# Patient Record
Sex: FEMALE | Race: OTHER | NOT HISPANIC OR LATINO | URBAN - METROPOLITAN AREA
[De-identification: names, ages, dates, MRNs, and addresses within clinical notes are randomized per-mention and may not be internally consistent; named-entity substitution may affect disease eponyms.]

---

## 2021-02-21 ENCOUNTER — EMERGENCY (EMERGENCY)
Facility: HOSPITAL | Age: 26
LOS: 1 days | Discharge: HOME | End: 2021-02-21
Attending: EMERGENCY MEDICINE | Admitting: EMERGENCY MEDICINE
Payer: MEDICAID

## 2021-02-21 VITALS
HEIGHT: 63 IN | RESPIRATION RATE: 18 BRPM | TEMPERATURE: 98 F | SYSTOLIC BLOOD PRESSURE: 138 MMHG | HEART RATE: 95 BPM | OXYGEN SATURATION: 98 % | DIASTOLIC BLOOD PRESSURE: 86 MMHG | WEIGHT: 192.02 LBS

## 2021-02-21 DIAGNOSIS — R07.89 OTHER CHEST PAIN: ICD-10-CM

## 2021-02-21 PROCEDURE — 71046 X-RAY EXAM CHEST 2 VIEWS: CPT | Mod: 26

## 2021-02-21 PROCEDURE — 99284 EMERGENCY DEPT VISIT MOD MDM: CPT

## 2021-02-21 PROCEDURE — 71046 X-RAY EXAM CHEST 2 VIEWS: CPT

## 2021-02-21 PROCEDURE — 99283 EMERGENCY DEPT VISIT LOW MDM: CPT

## 2021-02-21 NOTE — ED ADULT NURSE NOTE - OBJECTIVE STATEMENT
Patient c/o of right chest pain and tenderness, s/p states did a backflip at home, fell off from bed, w/ pain worse when lifting right arm , able to raise right arm over head w/ some pain.  States felt a cracking sound on right chest when did the backflip, even before falling.  Denies hitting head, no neck or back pain.

## 2021-02-21 NOTE — ED PROVIDER NOTE - CLINICAL SUMMARY MEDICAL DECISION MAKING FREE TEXT BOX
24 y/o healthy F presents to the ED c/o R sided chest wall pain x 1 week. Low suspicion for PTX, but will get XR chest to r/o. Currently satting 98% on room air, non-tachypneic, good breath sounds. 24 y/o healthy F presents to the ED c/o R sided chest wall pain x 1 week. Currently satting 98% on room air, non-tachypneic, good breath sounds.  Low suspicion for PTX, but will get XR chest to r/o. 24 y/o healthy F presents to the ED c/o R sided chest wall pain x 1 week. Currently satting 98% on room air, non-tachypneic, good breath sounds.  Low suspicion for PTX, but will get XR chest to r/o.  CXR neg.  Feels better with neb.  Inhaler prescribed.

## 2021-02-21 NOTE — ED ADULT TRIAGE NOTE - CHIEF COMPLAINT QUOTE
last week pt did a back flip and fell off the bed. reports R sided chest soreness since. Pain worsened with movement of R shoulder. denies SOB, fevers. chills cough.

## 2021-02-21 NOTE — ED PROVIDER NOTE - MUSCULOSKELETAL, MLM
Spine appears normal, range of motion is not limited, no muscle or joint tenderness, nonreproducible chest wall tenderness, no ecchymosis to the chest wall.

## 2021-02-21 NOTE — ED PROVIDER NOTE - NSFOLLOWUPINSTRUCTIONS_ED_ALL_ED_FT
Take ibuprofen as directed for chest wall pain.    Follow up with your PMD.               Chest Wall Pain    WHAT YOU NEED TO KNOW:    What do I need to know about chest wall pain? Chest wall pain may be caused by problems with the muscles, cartilage, or bones of the chest wall. Chest wall pain may also be caused by pain that spreads to your chest from another part of your body. The pain may be aching, severe, dull, or sharp. It may come and go, or it may be constant. The pain may be worse when you move in certain ways, breathe deeply, or cough.     What causes chest wall pain?   •Conditions that affect the joints or cartilage of the chest wall, such as arthritis or costochondritis      •Strain or injury of the chest wall muscles       •Fractures of the ribs or vertebrae (bones in your spine)      •Herniation of the discs in the upper or middle section of your spine       •Shingles      How is chest wall pain diagnosed? Your healthcare provider will ask you to describe your pain. Tell him when the pain started, what type of pain it is, and what makes it better or worse. He will also ask if you have any other symptoms. He will examine your chest. He may also ask you to move your arms in different directions to see how it affects your pain. Ask your healthcare provider about these and other tests you may need:   •A chest x-ray may show the cause of your chest wall pain. You may need more than one x-ray.      •An MRI takes pictures of your chest or spine to show the cause of your chest wall pain. You may be given contrast liquid to help the pictures show up better. Tell a healthcare provider if you have ever had an allergic reaction to contrast liquid. Do not enter the MRI room with anything metal. Metal can cause serious injury. Tell a healthcare provider if you have any metal in or on your body.      How is chest wall pain treated? Treatment depends on the cause of your chest wall pain. You may need any of the following:   •NSAIDs, such as ibuprofen, help decrease swelling, pain, and fever. This medicine is available with or without a doctor's order. NSAIDs can cause stomach bleeding or kidney problems in certain people. If you take blood thinner medicine, always ask your healthcare provider if NSAIDs are safe for you. Always read the medicine label and follow directions.      •Acetaminophen decreases pain. It is available without a doctor's order. Ask how much to take and how often to take it. Follow directions. Acetaminophen can cause liver damage if not taken correctly.      •A cream may be applied to your chest to decrease pain.       •Apply heat on your chest for 20 to 30 minutes every 2 hours for as many days as directed. Heat helps decrease pain and muscle spasms.      •Apply ice on your chest for 15 to 20 minutes every hour or as directed. Use an ice pack, or put crushed ice in a plastic bag. Cover it with a towel. Ice helps prevent tissue damage and decreases swelling and pain.       Call 911 if:   •You have any of the following signs of a heart attack: ?Squeezing, pressure, or pain in your chest      ?You may also have any of the following: ?Discomfort or pain in your back, neck, jaw, stomach, or arm      ?Shortness of breath      ?Nausea or vomiting      ?Lightheadedness or a sudden cold sweat            When should I seek immediate care?   •You have severe pain.          When should I contact my healthcare provider?   •You develop a rash.       •You have other new symptoms.      •Your pain does not improve, even with treatment.      •You have questions or concerns about your condition or care.       CARE AGREEMENT:    You have the right to help plan your care. Learn about your health condition and how it may be treated. Discuss treatment options with your healthcare providers to decide what care you want to receive. You always have the right to refuse treatment.        © Copyright [employer] 2021           back to top                          © Copyright [employer] 2021

## 2021-02-21 NOTE — ED ADULT NURSE NOTE - NSIMPLEMENTINTERV_GEN_ALL_ED
Implemented All Fall Risk Interventions:  [place] to call system. Call bell, personal items and telephone within reach. Instruct patient to call for assistance. Room bathroom lighting operational. Non-slip footwear when patient is off stretcher. Physically safe environment: no spills, clutter or unnecessary equipment. Stretcher in lowest position, wheels locked, appropriate side rails in place. Provide visual cue, wrist band, yellow gown, etc. Monitor gait and stability. Monitor for mental status changes and reorient to person, place, and time. Review medications for side effects contributing to fall risk. Reinforce activity limits and safety measures with patient and family.

## 2021-02-21 NOTE — ED PROVIDER NOTE - OBJECTIVE STATEMENT
26 y/o healthy F presents to the ED c/o R sided chest wall pain. Pt states about a week ago today, she was teaching her daughter how to do a backflip on her bed and fell off, hitting her R chest on the ground. Pt says there was a bruise on her chest initially but that went away, but still has pain with arm movements and when taking deep breaths. Currently not SOB. Denies any other injuries.

## 2021-02-21 NOTE — ED PROVIDER NOTE - PATIENT PORTAL LINK FT
You can access the FollowMyHealth Patient Portal offered by [facility] by registering at the following website: [email or web address]. By joining [employer]’s FollowMyHealth portal, you will also be able to view your health information using other applications (apps) compatible with our system.

## 2021-08-01 ENCOUNTER — EMERGENCY (EMERGENCY)
Facility: HOSPITAL | Age: 26
LOS: 1 days | Discharge: HOME | End: 2021-08-01
Attending: EMERGENCY MEDICINE | Admitting: EMERGENCY MEDICINE
Payer: MEDICAID

## 2021-08-01 VITALS
RESPIRATION RATE: 18 BRPM | SYSTOLIC BLOOD PRESSURE: 117 MMHG | DIASTOLIC BLOOD PRESSURE: 79 MMHG | WEIGHT: 171.08 LBS | TEMPERATURE: 99 F | HEART RATE: 82 BPM | OXYGEN SATURATION: 100 % | HEIGHT: 63 IN

## 2021-08-01 VITALS
TEMPERATURE: 98 F | SYSTOLIC BLOOD PRESSURE: 120 MMHG | OXYGEN SATURATION: 96 % | DIASTOLIC BLOOD PRESSURE: 81 MMHG | RESPIRATION RATE: 18 BRPM | HEART RATE: 88 BPM

## 2021-08-01 DIAGNOSIS — K62.5 HEMORRHAGE OF ANUS AND RECTUM: ICD-10-CM

## 2021-08-01 DIAGNOSIS — K64.9 UNSPECIFIED HEMORRHOIDS: ICD-10-CM

## 2021-08-01 DIAGNOSIS — R42 DIZZINESS AND GIDDINESS: ICD-10-CM

## 2021-08-01 PROBLEM — Z78.9 OTHER SPECIFIED HEALTH STATUS: Chronic | Status: ACTIVE | Noted: 2021-02-21

## 2021-08-01 LAB
ALBUMIN SERPL ELPH-MCNC: 4.4 G/DL — SIGNIFICANT CHANGE UP (ref 3.3–5)
ALP SERPL-CCNC: 70 U/L — SIGNIFICANT CHANGE UP (ref 40–120)
ALT FLD-CCNC: 13 U/L — SIGNIFICANT CHANGE UP (ref 10–45)
ANION GAP SERPL CALC-SCNC: 12 MMOL/L — SIGNIFICANT CHANGE UP (ref 5–17)
AST SERPL-CCNC: 16 U/L — SIGNIFICANT CHANGE UP (ref 10–40)
BASOPHILS # BLD AUTO: 0.03 K/UL — SIGNIFICANT CHANGE UP (ref 0–0.2)
BASOPHILS NFR BLD AUTO: 0.4 % — SIGNIFICANT CHANGE UP (ref 0–2)
BILIRUB SERPL-MCNC: 0.4 MG/DL — SIGNIFICANT CHANGE UP (ref 0.2–1.2)
BUN SERPL-MCNC: 14 MG/DL — SIGNIFICANT CHANGE UP (ref 7–23)
CALCIUM SERPL-MCNC: 9.4 MG/DL — SIGNIFICANT CHANGE UP (ref 8.4–10.5)
CHLORIDE SERPL-SCNC: 105 MMOL/L — SIGNIFICANT CHANGE UP (ref 96–108)
CO2 SERPL-SCNC: 24 MMOL/L — SIGNIFICANT CHANGE UP (ref 22–31)
CREAT SERPL-MCNC: 0.54 MG/DL — SIGNIFICANT CHANGE UP (ref 0.5–1.3)
EOSINOPHIL # BLD AUTO: 0.1 K/UL — SIGNIFICANT CHANGE UP (ref 0–0.5)
EOSINOPHIL NFR BLD AUTO: 1.2 % — SIGNIFICANT CHANGE UP (ref 0–6)
GLUCOSE SERPL-MCNC: 80 MG/DL — SIGNIFICANT CHANGE UP (ref 70–99)
HCT VFR BLD CALC: 40.3 % — SIGNIFICANT CHANGE UP (ref 34.5–45)
HGB BLD-MCNC: 12.8 G/DL — SIGNIFICANT CHANGE UP (ref 11.5–15.5)
IMM GRANULOCYTES NFR BLD AUTO: 0.2 % — SIGNIFICANT CHANGE UP (ref 0–1.5)
LYMPHOCYTES # BLD AUTO: 1.56 K/UL — SIGNIFICANT CHANGE UP (ref 1–3.3)
LYMPHOCYTES # BLD AUTO: 18.8 % — SIGNIFICANT CHANGE UP (ref 13–44)
MCHC RBC-ENTMCNC: 27.9 PG — SIGNIFICANT CHANGE UP (ref 27–34)
MCHC RBC-ENTMCNC: 31.8 GM/DL — LOW (ref 32–36)
MCV RBC AUTO: 87.8 FL — SIGNIFICANT CHANGE UP (ref 80–100)
MONOCYTES # BLD AUTO: 0.29 K/UL — SIGNIFICANT CHANGE UP (ref 0–0.9)
MONOCYTES NFR BLD AUTO: 3.5 % — SIGNIFICANT CHANGE UP (ref 2–14)
NEUTROPHILS # BLD AUTO: 6.31 K/UL — SIGNIFICANT CHANGE UP (ref 1.8–7.4)
NEUTROPHILS NFR BLD AUTO: 75.9 % — SIGNIFICANT CHANGE UP (ref 43–77)
NRBC # BLD: 0 /100 WBCS — SIGNIFICANT CHANGE UP (ref 0–0)
PLATELET # BLD AUTO: 279 K/UL — SIGNIFICANT CHANGE UP (ref 150–400)
POTASSIUM SERPL-MCNC: 3.5 MMOL/L — SIGNIFICANT CHANGE UP (ref 3.5–5.3)
POTASSIUM SERPL-SCNC: 3.5 MMOL/L — SIGNIFICANT CHANGE UP (ref 3.5–5.3)
PROT SERPL-MCNC: 7.1 G/DL — SIGNIFICANT CHANGE UP (ref 6–8.3)
RBC # BLD: 4.59 M/UL — SIGNIFICANT CHANGE UP (ref 3.8–5.2)
RBC # FLD: 14.5 % — SIGNIFICANT CHANGE UP (ref 10.3–14.5)
SODIUM SERPL-SCNC: 141 MMOL/L — SIGNIFICANT CHANGE UP (ref 135–145)
WBC # BLD: 8.31 K/UL — SIGNIFICANT CHANGE UP (ref 3.8–10.5)
WBC # FLD AUTO: 8.31 K/UL — SIGNIFICANT CHANGE UP (ref 3.8–10.5)

## 2021-08-01 PROCEDURE — 99283 EMERGENCY DEPT VISIT LOW MDM: CPT

## 2021-08-01 PROCEDURE — 85025 COMPLETE CBC W/AUTO DIFF WBC: CPT

## 2021-08-01 PROCEDURE — 93005 ELECTROCARDIOGRAM TRACING: CPT

## 2021-08-01 PROCEDURE — 36415 COLL VENOUS BLD VENIPUNCTURE: CPT

## 2021-08-01 PROCEDURE — 80053 COMPREHEN METABOLIC PANEL: CPT

## 2021-08-01 PROCEDURE — 82962 GLUCOSE BLOOD TEST: CPT

## 2021-08-01 PROCEDURE — 99284 EMERGENCY DEPT VISIT MOD MDM: CPT

## 2021-08-01 PROCEDURE — 93010 ELECTROCARDIOGRAM REPORT: CPT

## 2021-08-01 NOTE — ED ADULT NURSE NOTE - NSIMPLEMENTINTERV_GEN_ALL_ED
Implemented All Universal Safety Interventions:  [place] to call system. Call bell, personal items and telephone within reach. Instruct patient to call for assistance. Room bathroom lighting operational. Non-slip footwear when patient is off stretcher. Physically safe environment: no spills, clutter or unnecessary equipment. Stretcher in lowest position, wheels locked, appropriate side rails in place.

## 2021-08-01 NOTE — ED PROVIDER NOTE - CARE PROVIDERS DIRECT ADDRESSES
[email or web address]

## 2021-08-01 NOTE — ED PROVIDER NOTE - CLINICAL SUMMARY MEDICAL DECISION MAKING FREE TEXT BOX
BRBPR with BM. pt well appearing. no bleeding on exam. + hemorrhoids. vss. labs noted hgb wnl. suspect bleeding due to hemorrhoids. ecg done at triage as pt reported dizziness earlier today. no current dizziness. no ischemic changes. will dc home to f/u with GI. sitz bath, increase fluids and fiber. return precuations d/w pt.

## 2021-08-01 NOTE — ED PROVIDER NOTE - GASTROINTESTINAL, MLM
Abdomen soft, non-tender, no guarding, no rebound. well healing lower abdominal scar. SUSI + brown colored stool. no BRB. + non swollen hemorrhoid present

## 2021-08-01 NOTE — ED PROVIDER NOTE - NSFOLLOWUPINSTRUCTIONS_ED_ALL_ED_FT
Follow up with gastroenterology within one week.       Rectal Bleeding    WHAT YOU NEED TO KNOW:    Rectal bleeding can be caused by constipation, hemorrhoids, or anal fissures. It may also be caused by polyps, tumors, or medical conditions, such as colitis or diverticulitis.    DISCHARGE INSTRUCTIONS:    Medicines:   •Pain medicine: You may be given medicine to take away or decrease pain. Do not wait until the pain is severe before you take your medicine.      •Iron supplement: Iron helps your body make more red blood cells.       •Steroids: This medicine decreases inflammation in your rectum. It may be applied as a cream, ointment, or lotion.      •Take your medicine as directed. Contact your healthcare provider if you think your medicine is not helping or if you have side effects. Tell him of her if you are allergic to any medicine. Keep a list of the medicines, vitamins, and herbs you take. Include the amounts, and when and why you take them. Bring the list or the pill bottles to follow-up visits. Carry your medicine list with you in case of an emergency.      Follow up with your healthcare provider as directed: Write down your questions so you remember to ask them during your visits.     Drink liquids as directed: Ask your healthcare provider how much liquid to drink each day and which liquids are best for you. This will help prevent dehydration and constipation.    Contact your healthcare provider if:   •You have a fever.      •Your rectal bleeding stopped for a time, but has started again.       •You have nausea.      •You have cold, sweaty, pale skin.      •You have changes in your bowel movements, such as diarrhea.      •You have questions or concerns about your condition or care.      Return to the emergency department if:   •You are breathing faster than usual.      •You are dizzy, lightheaded, or feel faint.      •You are confused or cannot think clearly.      •You urinate less than usual or not at all.      •Your rectal bleeding is constant or heavy.      •You have severe abdominal pain or cramping.         © Copyright [employer] 2021           back to top                          © Copyright [employer] 2021

## 2021-08-01 NOTE — ED ADULT NURSE NOTE - OBJECTIVE STATEMENT
Pt came to ER with c/o "rectal pain and bleeding on friday after a hard bowel movement and today too". Pt denies NVD, fever, chills. Pt states she has a chronic hx of constipation but had been ok for a while after her surgery.

## 2021-08-01 NOTE — ED PROVIDER NOTE - PATIENT PORTAL LINK FT
You can access the FollowMyHealth Patient Portal offered by [facility] by registering at the following website: [email or web address]. By joining [employer]’s FollowMyHealth portal, you will also be able to view your health information using other applications (apps) compatible with our system.

## 2021-08-01 NOTE — ED PROVIDER NOTE - ATTENDING CONTRIBUTION TO CARE
as per HPI.   Vitals wnl, exam as above. Very well appearing. labs grossly wnl.  Clinically no indication for further emergent ED workup or hospitalization at this time. Comfortable for dc, outpt f/u.

## 2021-08-01 NOTE — ED PROVIDER NOTE - PROVIDER TOKENS
PROVIDER:[TOKEN:[[id number]],FOLLOWUP:[7-10 Days]],PROVIDER:[TOKEN:[[id number]],FOLLOWUP:[7-10 Days]]

## 2021-08-01 NOTE — ED PROVIDER NOTE - OBJECTIVE STATEMENT
26 y/o female with hx of abdominoplasty 1 month ago c/o rectal bleeding. pt states was straining to have BM 2 days ago and noticed BRB on tissue and in stool. pt notes no BM yesterday but bleeding occurred again today with BM. no melena. no abd pain, n/v. no fever or chills. pt notes hx of hemorrhoids after pregnancy. no urinary sx's. no vag dc or bleeding. Also pt notes felt lightheaded today. no ha or weakness. no visual changes. no cp or sob. no further complaints.

## 2021-08-01 NOTE — ED ADULT TRIAGE NOTE - ARRIVAL FROM
Home 4 - Moderately severe disability. Unable to own bodily needs without assistance and unable to walk unassisted

## 2021-08-01 NOTE — ED ADULT TRIAGE NOTE - CHIEF COMPLAINT QUOTE
Pt reports straining to have BM on Friday, noticed bright red blood in stool and wiping after BM. Pt reports one more episode of bleeding today, also reports intermittent lightheadedness, denies chest pain, sob, n/v, diarrhea.

## 2021-08-01 NOTE — ED PROVIDER NOTE - CARE PROVIDER_API CALL
[facility])  Medicine  [address], [address]  [place] [address]  Phone: [phone]  Fax: [phone]  Follow Up Time: 7-10 Days    Mane Fu)  Gastroenterology; Internal Medicine  [address] [place] [address]  Phone: [phone]  Fax: [phone]  Follow Up Time: 7-10 Days

## 2023-06-05 NOTE — ED ADULT NURSE NOTE - NSHOSCREENINGQ1_ED_ALL_ED
Recent fall, does have hx of back pain, recent PT for thoracic pain. SLR +ve ziggy. No acute concerns on exam. Rx IBU . Monitor closely, if persists consider Sports Medicine referral /MRI. Patient will call/message .  Feels she can have restrictions at work .   
No

## 2024-09-23 ENCOUNTER — APPOINTMENT (OUTPATIENT)
Dept: ANTEPARTUM | Facility: CLINIC | Age: 29
End: 2024-09-23

## 2024-09-23 ENCOUNTER — ASOB RESULT (OUTPATIENT)
Age: 29
End: 2024-09-23

## 2024-09-23 PROCEDURE — 76813 OB US NUCHAL MEAS 1 GEST: CPT

## 2024-09-23 PROCEDURE — 93976 VASCULAR STUDY: CPT

## 2024-09-23 PROCEDURE — 36415 COLL VENOUS BLD VENIPUNCTURE: CPT

## 2024-10-18 ENCOUNTER — APPOINTMENT (OUTPATIENT)
Dept: ANTEPARTUM | Facility: CLINIC | Age: 29
End: 2024-10-18

## 2024-10-18 ENCOUNTER — TRANSCRIPTION ENCOUNTER (OUTPATIENT)
Age: 29
End: 2024-10-18

## 2024-10-18 ENCOUNTER — ASOB RESULT (OUTPATIENT)
Age: 29
End: 2024-10-18

## 2024-10-18 PROCEDURE — 76817 TRANSVAGINAL US OBSTETRIC: CPT

## 2024-10-18 PROCEDURE — 76805 OB US >/= 14 WKS SNGL FETUS: CPT | Mod: 59

## 2024-11-22 ENCOUNTER — APPOINTMENT (OUTPATIENT)
Dept: ANTEPARTUM | Facility: CLINIC | Age: 29
End: 2024-11-22

## 2024-11-22 ENCOUNTER — ASOB RESULT (OUTPATIENT)
Age: 29
End: 2024-11-22

## 2024-11-22 PROCEDURE — 76817 TRANSVAGINAL US OBSTETRIC: CPT

## 2024-11-22 PROCEDURE — 76811 OB US DETAILED SNGL FETUS: CPT | Mod: 59

## 2024-12-13 ENCOUNTER — APPOINTMENT (OUTPATIENT)
Dept: ANTEPARTUM | Facility: CLINIC | Age: 29
End: 2024-12-13
Payer: COMMERCIAL

## 2024-12-13 ENCOUNTER — ASOB RESULT (OUTPATIENT)
Age: 29
End: 2024-12-13

## 2024-12-13 PROCEDURE — 76816 OB US FOLLOW-UP PER FETUS: CPT

## 2024-12-31 PROBLEM — Z00.00 ENCOUNTER FOR PREVENTIVE HEALTH EXAMINATION: Status: ACTIVE | Noted: 2024-12-31

## 2025-01-10 ENCOUNTER — ASOB RESULT (OUTPATIENT)
Age: 30
End: 2025-01-10

## 2025-01-10 ENCOUNTER — APPOINTMENT (OUTPATIENT)
Dept: ANTEPARTUM | Facility: CLINIC | Age: 30
End: 2025-01-10

## 2025-01-10 PROCEDURE — 76816 OB US FOLLOW-UP PER FETUS: CPT

## 2025-01-10 PROCEDURE — 76819 FETAL BIOPHYS PROFIL W/O NST: CPT | Mod: 59

## 2025-01-10 PROCEDURE — 76820 UMBILICAL ARTERY ECHO: CPT | Mod: 59

## 2025-02-07 ENCOUNTER — APPOINTMENT (OUTPATIENT)
Dept: ANTEPARTUM | Facility: CLINIC | Age: 30
End: 2025-02-07
Payer: COMMERCIAL

## 2025-02-07 ENCOUNTER — ASOB RESULT (OUTPATIENT)
Age: 30
End: 2025-02-07

## 2025-02-07 PROCEDURE — 76819 FETAL BIOPHYS PROFIL W/O NST: CPT | Mod: 59

## 2025-02-07 PROCEDURE — 76820 UMBILICAL ARTERY ECHO: CPT | Mod: 59

## 2025-02-07 PROCEDURE — 76816 OB US FOLLOW-UP PER FETUS: CPT

## 2025-02-25 ENCOUNTER — OUTPATIENT (OUTPATIENT)
Dept: OUTPATIENT SERVICES | Facility: HOSPITAL | Age: 30
LOS: 1 days | End: 2025-02-25
Payer: COMMERCIAL

## 2025-02-25 VITALS
RESPIRATION RATE: 18 BRPM | TEMPERATURE: 98 F | HEART RATE: 111 BPM | DIASTOLIC BLOOD PRESSURE: 86 MMHG | SYSTOLIC BLOOD PRESSURE: 124 MMHG

## 2025-02-25 DIAGNOSIS — O26.899 OTHER SPECIFIED PREGNANCY RELATED CONDITIONS, UNSPECIFIED TRIMESTER: ICD-10-CM

## 2025-02-25 DIAGNOSIS — Z98.890 OTHER SPECIFIED POSTPROCEDURAL STATES: Chronic | ICD-10-CM

## 2025-02-25 LAB
ALBUMIN SERPL ELPH-MCNC: 3.4 G/DL — SIGNIFICANT CHANGE UP (ref 3.3–5)
ALP SERPL-CCNC: 117 U/L — SIGNIFICANT CHANGE UP (ref 40–120)
ALT FLD-CCNC: 8 U/L — LOW (ref 10–45)
ANION GAP SERPL CALC-SCNC: 11 MMOL/L — SIGNIFICANT CHANGE UP (ref 5–17)
AST SERPL-CCNC: 9 U/L — LOW (ref 10–40)
BASOPHILS # BLD AUTO: 0.02 K/UL — SIGNIFICANT CHANGE UP (ref 0–0.2)
BASOPHILS NFR BLD AUTO: 0.2 % — SIGNIFICANT CHANGE UP (ref 0–2)
BILIRUB SERPL-MCNC: 0.3 MG/DL — SIGNIFICANT CHANGE UP (ref 0.2–1.2)
BUN SERPL-MCNC: 7 MG/DL — SIGNIFICANT CHANGE UP (ref 7–23)
CALCIUM SERPL-MCNC: 8.4 MG/DL — SIGNIFICANT CHANGE UP (ref 8.4–10.5)
CHLORIDE SERPL-SCNC: 105 MMOL/L — SIGNIFICANT CHANGE UP (ref 96–108)
CO2 SERPL-SCNC: 20 MMOL/L — LOW (ref 22–31)
CREAT ?TM UR-MCNC: 154 MG/DL — SIGNIFICANT CHANGE UP
CREAT SERPL-MCNC: 0.44 MG/DL — LOW (ref 0.5–1.3)
EGFR: 133 ML/MIN/1.73M2 — SIGNIFICANT CHANGE UP
EOSINOPHIL # BLD AUTO: 0.03 K/UL — SIGNIFICANT CHANGE UP (ref 0–0.5)
EOSINOPHIL NFR BLD AUTO: 0.3 % — SIGNIFICANT CHANGE UP (ref 0–6)
FIBRINOGEN PPP-MCNC: 395 MG/DL — SIGNIFICANT CHANGE UP (ref 200–445)
GLUCOSE SERPL-MCNC: 85 MG/DL — SIGNIFICANT CHANGE UP (ref 70–99)
HCT VFR BLD CALC: 32.7 % — LOW (ref 34.5–45)
HGB BLD-MCNC: 10.4 G/DL — LOW (ref 11.5–15.5)
IMM GRANULOCYTES NFR BLD AUTO: 0.5 % — SIGNIFICANT CHANGE UP (ref 0–0.9)
LDH SERPL L TO P-CCNC: 148 U/L — SIGNIFICANT CHANGE UP (ref 50–242)
LYMPHOCYTES # BLD AUTO: 1.75 K/UL — SIGNIFICANT CHANGE UP (ref 1–3.3)
LYMPHOCYTES # BLD AUTO: 19.7 % — SIGNIFICANT CHANGE UP (ref 13–44)
MCHC RBC-ENTMCNC: 26.6 PG — LOW (ref 27–34)
MCHC RBC-ENTMCNC: 31.8 G/DL — LOW (ref 32–36)
MCV RBC AUTO: 83.6 FL — SIGNIFICANT CHANGE UP (ref 80–100)
MONOCYTES # BLD AUTO: 0.46 K/UL — SIGNIFICANT CHANGE UP (ref 0–0.9)
MONOCYTES NFR BLD AUTO: 5.2 % — SIGNIFICANT CHANGE UP (ref 2–14)
NEUTROPHILS # BLD AUTO: 6.57 K/UL — SIGNIFICANT CHANGE UP (ref 1.8–7.4)
NEUTROPHILS NFR BLD AUTO: 74.1 % — SIGNIFICANT CHANGE UP (ref 43–77)
NRBC BLD AUTO-RTO: 0 /100 WBCS — SIGNIFICANT CHANGE UP (ref 0–0)
PLATELET # BLD AUTO: 168 K/UL — SIGNIFICANT CHANGE UP (ref 150–400)
POTASSIUM SERPL-MCNC: 4.2 MMOL/L — SIGNIFICANT CHANGE UP (ref 3.5–5.3)
POTASSIUM SERPL-SCNC: 4.2 MMOL/L — SIGNIFICANT CHANGE UP (ref 3.5–5.3)
PROT ?TM UR-MCNC: 18 MG/DL — HIGH (ref 0–12)
PROT SERPL-MCNC: 6.5 G/DL — SIGNIFICANT CHANGE UP (ref 6–8.3)
PROT/CREAT UR-RTO: 0.1 RATIO — SIGNIFICANT CHANGE UP (ref 0–0.2)
RAPID RVP RESULT: SIGNIFICANT CHANGE UP
RBC # BLD: 3.91 M/UL — SIGNIFICANT CHANGE UP (ref 3.8–5.2)
RBC # FLD: 14.6 % — HIGH (ref 10.3–14.5)
SARS-COV-2 RNA SPEC QL NAA+PROBE: SIGNIFICANT CHANGE UP
SODIUM SERPL-SCNC: 136 MMOL/L — SIGNIFICANT CHANGE UP (ref 135–145)
URATE SERPL-MCNC: 4 MG/DL — SIGNIFICANT CHANGE UP (ref 2.5–7)
WBC # BLD: 8.87 K/UL — SIGNIFICANT CHANGE UP (ref 3.8–10.5)
WBC # FLD AUTO: 8.87 K/UL — SIGNIFICANT CHANGE UP (ref 3.8–10.5)

## 2025-02-25 PROCEDURE — 83615 LACTATE (LD) (LDH) ENZYME: CPT

## 2025-02-25 PROCEDURE — 80053 COMPREHEN METABOLIC PANEL: CPT

## 2025-02-25 PROCEDURE — 99213 OFFICE O/P EST LOW 20 MIN: CPT | Mod: 25

## 2025-02-25 PROCEDURE — 0225U NFCT DS DNA&RNA 21 SARSCOV2: CPT

## 2025-02-25 PROCEDURE — 59025 FETAL NON-STRESS TEST: CPT | Mod: 26,59

## 2025-02-25 PROCEDURE — 76818 FETAL BIOPHYS PROFILE W/NST: CPT | Mod: 26

## 2025-02-25 PROCEDURE — 76818 FETAL BIOPHYS PROFILE W/NST: CPT

## 2025-02-25 PROCEDURE — 82570 ASSAY OF URINE CREATININE: CPT

## 2025-02-25 PROCEDURE — 84156 ASSAY OF PROTEIN URINE: CPT

## 2025-02-25 PROCEDURE — 99214 OFFICE O/P EST MOD 30 MIN: CPT

## 2025-02-25 PROCEDURE — 59025 FETAL NON-STRESS TEST: CPT

## 2025-02-25 PROCEDURE — 82962 GLUCOSE BLOOD TEST: CPT

## 2025-02-25 PROCEDURE — 85384 FIBRINOGEN ACTIVITY: CPT

## 2025-02-25 PROCEDURE — 36415 COLL VENOUS BLD VENIPUNCTURE: CPT

## 2025-02-25 PROCEDURE — 96360 HYDRATION IV INFUSION INIT: CPT

## 2025-02-25 PROCEDURE — 84550 ASSAY OF BLOOD/URIC ACID: CPT

## 2025-02-25 PROCEDURE — 85025 COMPLETE CBC W/AUTO DIFF WBC: CPT

## 2025-02-25 RX ORDER — SODIUM CHLORIDE 9 G/1000ML
1000 INJECTION, SOLUTION INTRAVENOUS ONCE
Refills: 0 | Status: COMPLETED | OUTPATIENT
Start: 2025-02-25 | End: 2025-02-25

## 2025-02-25 RX ORDER — ACETAMINOPHEN 500 MG/5ML
1000 LIQUID (ML) ORAL ONCE
Refills: 0 | Status: COMPLETED | OUTPATIENT
Start: 2025-02-25 | End: 2025-02-25

## 2025-02-25 RX ADMIN — Medication 1000 MILLIGRAM(S): at 15:21

## 2025-02-25 RX ADMIN — Medication 1000 MILLIGRAM(S): at 14:31

## 2025-02-25 RX ADMIN — SODIUM CHLORIDE 1000 MILLILITER(S): 9 INJECTION, SOLUTION INTRAVENOUS at 14:25

## 2025-02-25 NOTE — OB PROVIDER TRIAGE NOTE - HISTORY OF PRESENT ILLNESS
ARMANDO [id number]  S: 29yFemale G2P@ **w**d presents due to ****. Reports +FM, no LOF/VB/CTX. Denies h/a, visions chagnes, RUQ/epigastric pain.     Ante: spontaneous, nipt wnl, anatomy sono wnl, gct wnl, gbs neg/pos*, EFW *  Pregnancy problems:     Ob: denies  GYN: denies  PMHx: denies  Surg: denies  Meds: denies  No Known Allergies      PE  T(C): --  HR: --  BP: --  RR: --  SpO2: --  General: No apparent distress; Lying comfortably in bed  Pulmonary: No increased work of breathing  Abdomen: Soft; Nontender; Gravid  Extremities: Trace pedal edema bilaterally; No calf tenderness bilaterally  SVE:   SSE:     NST:   [place]:   TAUS:        ARMANDO [id number]  S: 29y Female  @34.4w presents with multiple complaints. Patients reports a head Reports +FM, no LOF/VB/CTX. Denies h/a, visions changes RUQ/epigastric pain.     Ante: spontaneous, nipt wnl, anatomy sono wnl, failed gct- did not complete gtt and unable to monitor FS therefore managing with diet control , gbs unknown, EFW 2300g    Ob:   2400g @39w (GDM), SAB   GYN: denies  PMHx: denies  Surg: l/s R ov cystectomy during pregnancy , abdinoplasty   Meds: PNV  No Known Allergies      PE  -124/76-87   HR 94  General: No apparent distress; Lying comfortably in bed  Pulmonary: No increased work of breathing  Abdomen: Soft; Nontender; Gravid  Extremities: Trace pedal edema bilaterally; No calf tenderness bilaterally    NST:   [place]:   TAUS:        ARMANDO [id number]  S: 29y Female  @34.4w presents with multiple complaints. Patients reports an intermittent headache x 4 days. She states it has improved over the last 24 hours and is now resolved. She also reports spots in her vision with some wavy lines, but denies lights or flashes in her vision. Additionally she complains of nausea, however reports regular appetite and no vomiting. Also reports LE swelling over the weekend, now resolved.  Reports occasional infrequent, nonpainful contractions. Reports +FM, no LOF/VB. Denies RUQ/epigastric pain.     Ante: spontaneous, nipt wnl, anatomy sono wnl, failed gct- did not complete gtt and unable to monitor FS therefore managing with diet control , gbs unknown, EFW 2300g    Ob:   2400g @39w (GDM), SAB   GYN: denies  PMHx: denies  Surg: l/s R ov cystectomy during pregnancy , abdominoplasty   Meds: PNV  No Known Allergies      PE  -124/76-87   HR 94  General: No apparent distress; Lying comfortably in bed  Pulmonary: No increased work of breathing  Abdomen: Soft; Nontender; Gravid  Extremities: Trace pedal edema bilaterally; No calf tenderness bilaterally    NST: 150bpm, moderate variability, +accels, no decels  [place]: CTX <1 in 10 min   TAUS: cephalic, anterior placenta, CRISTINE 9.65, BPP 8/8       ARMANDO [id number]  S: 29y Female  @34.4w presents with multiple complaints. Patients reports an intermittent headache x 4 days. She states it has improved over the last 24 hours and is now resolved. She also reports spots in her vision with some wavy lines, but denies lights or flashes in her vision. Additionally she complains of nausea, however reports regular appetite and no vomiting. Also reports LE swelling over the weekend, now resolved.  Reports occasional infrequent, nonpainful contractions. Reports +FM, no LOF/VB. Denies RUQ/epigastric pain.     Ante: spontaneous, nipt wnl, anatomy sono wnl, failed gct- did not complete gtt and unable to monitor FS therefore managing with diet control , gbs unknown, EFW 2300g    Ob:   2400g @39w (GDM), SAB   GYN: denies  PMHx: denies  Surg: l/s R ov cystectomy during pregnancy , abdominoplasty   Meds: PNV  No Known Allergies      PE  -124/76-87   HR 94  General: No apparent distress; Lying comfortably in bed  Pulmonary: No increased work of breathing  Abdomen: Soft; Nontender; Gravid  Extremities: Trace pedal edema bilaterally; No calf tenderness bilaterally    NST: 150bpm, moderate variability, +accels, no decels  [place]: CTX <1 in 10 min   TAUS: cephalic, anterior placenta, CRISTINE 9.65, BPP 8/8 ultrasound image attached

## 2025-02-25 NOTE — OB RN TRIAGE NOTE - FALL HARM RISK - UNIVERSAL INTERVENTIONS
Bed in lowest position, wheels locked, appropriate side rails in place/Call bell, personal items and telephone in reach/Instruct patient to call for assistance before getting out of bed or chair/Non-slip footwear when patient is out of bed/[place] to call system/Physically safe environment - no spills, clutter or unnecessary equipment/Purposeful Proactive Rounding/Room/bathroom lighting operational, light cord in reach

## 2025-02-25 NOTE — OB PROVIDER TRIAGE NOTE - NSOBPROVIDERNOTE_OBGYN_ALL_OB_FT
A/P: 29y  @34.4w with visual disturbances and resolved headache, ruled out for PEC   - Fetal status reassuring on NST and BPP. Initally patient presented with fetal tachycardia, s/p IV LR bolus, now resolved and reactive and reassuring.   - Full PEC workup complete. BPs normal range and labs wnl. P/c 0.1.   - Constellation of symptoms (h/a, vision spots, nausea) c/w viral illness. RVP pending. S/p tylenol.   - Patient stable for discharge. PEC, PTL, fetal kick count, and ROM precautions discussed. Follow up in the office as scheduled. Patient verbalizes understanding     D/w Dr. Peralta, attending  Trina NOGUEIRA

## 2025-02-26 PROBLEM — Z78.9 OTHER SPECIFIED HEALTH STATUS: Chronic | Status: INACTIVE | Noted: 2021-02-21 | Resolved: 2025-02-25

## 2025-02-28 ENCOUNTER — APPOINTMENT (OUTPATIENT)
Dept: ANTEPARTUM | Facility: CLINIC | Age: 30
End: 2025-02-28
Payer: COMMERCIAL

## 2025-02-28 ENCOUNTER — ASOB RESULT (OUTPATIENT)
Age: 30
End: 2025-02-28

## 2025-02-28 PROBLEM — O03.9 COMPLETE OR UNSPECIFIED SPONTANEOUS ABORTION WITHOUT COMPLICATION: Chronic | Status: ACTIVE | Noted: 2025-02-25

## 2025-02-28 PROCEDURE — 76820 UMBILICAL ARTERY ECHO: CPT | Mod: 59

## 2025-02-28 PROCEDURE — 76816 OB US FOLLOW-UP PER FETUS: CPT

## 2025-02-28 PROCEDURE — 76819 FETAL BIOPHYS PROFIL W/O NST: CPT | Mod: 59

## 2025-03-06 ENCOUNTER — ASOB RESULT (OUTPATIENT)
Age: 30
End: 2025-03-06

## 2025-03-06 ENCOUNTER — APPOINTMENT (OUTPATIENT)
Dept: ANTEPARTUM | Facility: CLINIC | Age: 30
End: 2025-03-06
Payer: COMMERCIAL

## 2025-03-06 DIAGNOSIS — Z86.32 PERSONAL HISTORY OF GESTATIONAL DIABETES: ICD-10-CM

## 2025-03-06 DIAGNOSIS — Z3A.34 34 WEEKS GESTATION OF PREGNANCY: ICD-10-CM

## 2025-03-06 DIAGNOSIS — O99.891 OTHER SPECIFIED DISEASES AND CONDITIONS COMPLICATING PREGNANCY: ICD-10-CM

## 2025-03-06 DIAGNOSIS — O09.293 SUPERVISION OF PREGNANCY WITH OTHER POOR REPRODUCTIVE OR OBSTETRIC HISTORY, THIRD TRIMESTER: ICD-10-CM

## 2025-03-06 DIAGNOSIS — H53.8 OTHER VISUAL DISTURBANCES: ICD-10-CM

## 2025-03-06 DIAGNOSIS — O47.03 FALSE LABOR BEFORE 37 COMPLETED WEEKS OF GESTATION, THIRD TRIMESTER: ICD-10-CM

## 2025-03-06 DIAGNOSIS — Z20.822 CONTACT WITH AND (SUSPECTED) EXPOSURE TO COVID-19: ICD-10-CM

## 2025-03-06 DIAGNOSIS — O26.893 OTHER SPECIFIED PREGNANCY RELATED CONDITIONS, THIRD TRIMESTER: ICD-10-CM

## 2025-03-06 DIAGNOSIS — R11.0 NAUSEA: ICD-10-CM

## 2025-03-06 DIAGNOSIS — R51.9 HEADACHE, UNSPECIFIED: ICD-10-CM

## 2025-03-06 DIAGNOSIS — O36.8330 MATERNAL CARE FOR ABNORMALITIES OF THE FETAL HEART RATE OR RHYTHM, THIRD TRIMESTER, NOT APPLICABLE OR UNSPECIFIED: ICD-10-CM

## 2025-03-06 PROCEDURE — 76815 OB US LIMITED FETUS(S): CPT

## 2025-03-06 PROCEDURE — 76819 FETAL BIOPHYS PROFIL W/O NST: CPT

## 2025-03-06 PROCEDURE — 76820 UMBILICAL ARTERY ECHO: CPT

## 2025-03-10 ENCOUNTER — OUTPATIENT (OUTPATIENT)
Dept: OUTPATIENT SERVICES | Facility: HOSPITAL | Age: 30
LOS: 1 days | End: 2025-03-10
Payer: COMMERCIAL

## 2025-03-10 VITALS
HEART RATE: 88 BPM | RESPIRATION RATE: 18 BRPM | TEMPERATURE: 98 F | OXYGEN SATURATION: 100 % | SYSTOLIC BLOOD PRESSURE: 128 MMHG | DIASTOLIC BLOOD PRESSURE: 91 MMHG

## 2025-03-10 DIAGNOSIS — Z98.890 OTHER SPECIFIED POSTPROCEDURAL STATES: Chronic | ICD-10-CM

## 2025-03-10 DIAGNOSIS — O26.899 OTHER SPECIFIED PREGNANCY RELATED CONDITIONS, UNSPECIFIED TRIMESTER: ICD-10-CM

## 2025-03-10 PROCEDURE — 99214 OFFICE O/P EST MOD 30 MIN: CPT

## 2025-03-10 NOTE — OB PROVIDER TRIAGE NOTE - HISTORY OF PRESENT ILLNESS
ARMANDO HOA is a 28yo GP* at *** presenting for ***.     Ante: Spontaneous conception. NIPT low risk. Anatomy scan normal. Passed GCT. Denies elevated BP. GBS ***. EFW ***     OBHx: G  GYNHx: denies fibroids, cysts,  abnormal pap, STIs.     PMH: denies  PSH: ***  Meds: PNV  Allergies:     PE:  T(C): --  HR: --  BP: --  RR: --  SpO2: --  GEN: well-appearing, NAD  PULM: no increased WOB  ABD: soft, nontender, gravid  EXT: mild LE edema  TAUS: *** (image attached)   SVE: ***    FHT: baseline ***, moderate variability, +accels, no decels  TOCO: ctx ***  reactive & reassuring     A&P: 28yo G*P* at *** presents ***. Fetal status reassuring.   -     D/W    D/W   ARMANDO CAMARA is a 30yo  at 36+3 who presents for 2 day history of decreased fetal movement. Patient reports that since arriving in triage she has started to feel normal movement again. She reports that over the last 2 days the movements were less intense.     Ante: Spontaneous conception. NIPT low risk. Anatomy scan normal. gDMA1 - fasting fingersticks 80-90s.  Denies elevated BP. GBS unknown. EFW 3200g.     OBHx: SAB x 3,   c/b gDMA1. 2700g at 39 weeks.   GYNHx: denies fibroids, cysts,  abnormal pap, STIs.     PMH: denies  PSH: l/s R cystectomy , abdominoplasty    Meds: PNV  Allergies: NKDA     PE:  T(C): --  HR: --  BP: --  RR: --  SpO2: --  GEN: well-appearing, NAD  PULM: no increased WOB  ABD: soft, nontender, gravid  EXT: mild LE edema  TAUS:cephalic, anterior placenta, CRISTINE 10.2 cm, BPP 8/8 (image attached)    FHT: baseline 145, moderate variability, +accels, no decels  TOCO: no ctx   reactive & reassuring     A&P: 30yo  at 36+3 presents for decreased fetal movement over the last 2 days. However since arrival in triage she reports normal movement.   - NST reactive and reassuring. BPP 8/8. Fetal status reassuring.   - Discussed kick counts.   - Reviewed strict return precautions.   - Patient has appointment tomorrow at 1060 office. She also has f/u ultrasound appointment on Thursday, 3/13.     D/W Dr. Stanley  D/W Dr. Gatica

## 2025-03-10 NOTE — OB RN TRIAGE NOTE - NS_DISCHARGEPRINT_OBGYN_ALL_OB
General OB Triage Discharge Instructions Transplant Surgery - Multidisciplinary Rounds  --------------------------------------------------------------  DDRT 7/23/18  POD #12    Present:  Patient seen with multidisciplinary team (surgeon, Nephrologist, Resident MD, MD student)  in am rounds and examined with Dr. Brown. 31 y/o gentleman POD#11  R sided DDRT anastomosed to R external iliac artery and vein Ureter anastomosed to bladder over double J stent.  Cold ischemia time14 hours.  CMV +, EBV +. Course complicated by post-operative ureteral leak s/p reimplantation of ureter of transplanted kidney on  07/29/2018 and subsequent IR placement of nephrostomy tube 7/31.     No acute events overnight. BLE edema and scrotal swelling has improved. Creatinine remains around 3    Vital Signs Last 24 Hrs  T(C): 37 (06 Aug 2018 09:39), Max: 37 (06 Aug 2018 05:04)  T(F): 98.6 (06 Aug 2018 09:39), Max: 98.6 (06 Aug 2018 05:04)  HR: 62 (06 Aug 2018 09:39) (58 - 71)  BP: 162/95 (06 Aug 2018 09:39) (131/92 - 162/95)  BP(mean): --  RR: 18 (06 Aug 2018 09:39) (18 - 18)  SpO2: 96% (06 Aug 2018 09:39) (96% - 100%)    I&O's Detail    05 Aug 2018 07:01  -  06 Aug 2018 07:00  --------------------------------------------------------  IN:    Oral Fluid: 1200 mL  Total IN: 1200 mL    OUT:    Bulb: 49 mL    Indwelling Catheter - Urethral: 215 mL    Nephrostomy Tube: 1425 mL  Total OUT: 1689 mL    Total NET: -489 mL      06 Aug 2018 07:01  -  06 Aug 2018 10:19  --------------------------------------------------------  IN:  Total IN: 0 mL    OUT:    Nephrostomy Tube: 275 mL  Total OUT: 275 mL    Total NET: -275 mL                            9.9    8.6   )-----------( 221      ( 06 Aug 2018 06:12 )             29.3     08-06    140  |  111<H>  |  31<H>  ----------------------------<  98  5.3   |  18<L>  |  3.08<H>    Ca    10.6<H>      06 Aug 2018 06:11  Phos  2.2     08-06  Mg     1.8     08-06      CAPILLARY BLOOD GLUCOSE      POCT Blood Glucose.: 98 mg/dL (06 Aug 2018 07:54)  POCT Blood Glucose.: 124 mg/dL (05 Aug 2018 21:40)  POCT Blood Glucose.: 100 mg/dL (05 Aug 2018 16:45)  POCT Blood Glucose.: 100 mg/dL (05 Aug 2018 12:09)        MEDICATIONS  (STANDING):  docusate sodium 100 milliGRAM(s) Oral daily  famotidine    Tablet 20 milliGRAM(s) Oral daily  metoprolol tartrate 100 milliGRAM(s) Oral every 12 hours  mycophenolate mofetil 1000 milliGRAM(s) Oral every 12 hours  NIFEdipine XL 90 milliGRAM(s) Oral daily  nystatin    Suspension 213886 Unit(s) Swish and Swallow four times a day  oxybutynin 5 milliGRAM(s) Oral two times a day  polyethylene glycol 3350 17 Gram(s) Oral daily  predniSONE   Tablet 5 milliGRAM(s) Oral daily  senna 2 Tablet(s) Oral at bedtime  tacrolimus 4 milliGRAM(s) Oral two times a day  tamsulosin 0.4 milliGRAM(s) Oral at bedtime  trimethoprim   80 mG/sulfamethoxazole 400 mG 1 Tablet(s) Oral daily  valGANciclovir 450 milliGRAM(s) Oral daily    MEDICATIONS  (PRN):  acetaminophen   Tablet. 650 milliGRAM(s) Oral every 6 hours PRN Mild Pain (1 - 3)  diphenhydrAMINE   Capsule 25 milliGRAM(s) Oral every 6 hours PRN Rash and/or Itching  lidocaine 2% Gel 1 Application(s) Topical three times a day PRN pain related to urinary catheter  metoclopramide Injectable 10 milliGRAM(s) IV Push once PRN Nausea and/or Vomiting  ondansetron Injectable 4 milliGRAM(s) IV Push every 6 hours PRN Nausea  ondansetron Injectable 4 milliGRAM(s) IV Push once PRN Nausea and/or Vomiting  simethicone 80 milliGRAM(s) Chew every 6 hours PRN Gas  traMADol 50 milliGRAM(s) Oral every 4 hours PRN Severe Pain (7 - 10)  traMADol 25 milliGRAM(s) Oral every 4 hours PRN Moderate Pain (4 - 6)  zinc oxide 20% Ointment 1 Application(s) Topical three times a day PRN rash      Review of systems  Gen: No weight changes, fatigue, fevers/chills, weakness  Skin: No rashes  Head/Eyes/Ears/Mouth: No headache; Normal hearing; Normal vision w/o blurriness; No sinus pain/discomfort, sore throat  Respiratory: No dyspnea, cough, wheezing, hemoptysis  CV: No chest pain, PND, orthopnea  GI: Reports incisional pain and TTP, denies diarrhea, constipation, nausea, vomiting, melena, hematochezia. LACEY patent  : Marquez in situ  MSK: No joint pain/swelling; no back pain;   Neuro: No dizziness/lightheadedness, weakness, seizures, numbness, tingling  Heme: No easy bruising or bleeding  Endo: No heat/cold intolerance  Psych: No significant nervousness, anxiety, stress, depression  All other systems were reviewed and are negative, except as noted.    PHYSICAL EXAM:  Constitutional: Well developed / well nourished  Eyes: Anicteric, PERRLA  ENMT: nc/at  Neck: supple  Respiratory: CTA B/L  Cardiovascular: RRR  Gastrointestinal: Soft abdomen, mild tender to touch at surgical site, ND  Genitourinary: marquez in situ.  LACEY, nephrostomy tube patent  Extremities: SCD's in place and working bilaterally, BLE edema R>L  Vascular: Palpable dp pulses bilaterally  Neurological: A&O x3  Skin: wound open to air, no erythema and evidence of infection noted  Musculoskeletal: Moving all extremities  Psychiatric: Responsive

## 2025-03-10 NOTE — OB RN TRIAGE NOTE - CHIEF COMPLAINT QUOTE
"Since last night, I've been seeing spots in my vision and I feel like I am struggling to take a deep breath"

## 2025-03-12 DIAGNOSIS — O24.410 GESTATIONAL DIABETES MELLITUS IN PREGNANCY, DIET CONTROLLED: ICD-10-CM

## 2025-03-12 DIAGNOSIS — O36.8130 DECREASED FETAL MOVEMENTS, THIRD TRIMESTER, NOT APPLICABLE OR UNSPECIFIED: ICD-10-CM

## 2025-03-12 DIAGNOSIS — Z3A.36 36 WEEKS GESTATION OF PREGNANCY: ICD-10-CM

## 2025-03-12 DIAGNOSIS — O09.293 SUPERVISION OF PREGNANCY WITH OTHER POOR REPRODUCTIVE OR OBSTETRIC HISTORY, THIRD TRIMESTER: ICD-10-CM

## 2025-03-13 ENCOUNTER — ASOB RESULT (OUTPATIENT)
Age: 30
End: 2025-03-13

## 2025-03-13 ENCOUNTER — APPOINTMENT (OUTPATIENT)
Dept: ANTEPARTUM | Facility: CLINIC | Age: 30
End: 2025-03-13
Payer: COMMERCIAL

## 2025-03-13 PROCEDURE — 76820 UMBILICAL ARTERY ECHO: CPT | Mod: 59

## 2025-03-13 PROCEDURE — 76816 OB US FOLLOW-UP PER FETUS: CPT

## 2025-03-13 PROCEDURE — 76819 FETAL BIOPHYS PROFIL W/O NST: CPT | Mod: 59

## 2025-03-16 ENCOUNTER — INPATIENT (INPATIENT)
Facility: HOSPITAL | Age: 30
LOS: 1 days | Discharge: HOME | DRG: 833 | End: 2025-03-18
Attending: STUDENT IN AN ORGANIZED HEALTH CARE EDUCATION/TRAINING PROGRAM | Admitting: STUDENT IN AN ORGANIZED HEALTH CARE EDUCATION/TRAINING PROGRAM
Payer: COMMERCIAL

## 2025-03-16 VITALS
TEMPERATURE: 98 F | SYSTOLIC BLOOD PRESSURE: 128 MMHG | HEART RATE: 106 BPM | DIASTOLIC BLOOD PRESSURE: 86 MMHG | OXYGEN SATURATION: 100 % | RESPIRATION RATE: 16 BRPM

## 2025-03-16 DIAGNOSIS — O26.899 OTHER SPECIFIED PREGNANCY RELATED CONDITIONS, UNSPECIFIED TRIMESTER: ICD-10-CM

## 2025-03-16 DIAGNOSIS — Z98.890 OTHER SPECIFIED POSTPROCEDURAL STATES: Chronic | ICD-10-CM

## 2025-03-16 LAB
ALBUMIN SERPL ELPH-MCNC: 3.3 G/DL — SIGNIFICANT CHANGE UP (ref 3.3–5)
ALBUMIN SERPL ELPH-MCNC: 3.3 G/DL — SIGNIFICANT CHANGE UP (ref 3.3–5)
ALP SERPL-CCNC: 128 U/L — HIGH (ref 40–120)
ALT FLD-CCNC: 10 U/L — SIGNIFICANT CHANGE UP (ref 10–45)
ALT FLD-CCNC: 8 U/L — LOW (ref 10–45)
ANION GAP SERPL CALC-SCNC: 11 MMOL/L — SIGNIFICANT CHANGE UP (ref 5–17)
ANION GAP SERPL CALC-SCNC: 12 MMOL/L — SIGNIFICANT CHANGE UP (ref 5–17)
AST SERPL-CCNC: 12 U/L — SIGNIFICANT CHANGE UP (ref 10–40)
AST SERPL-CCNC: SIGNIFICANT CHANGE UP (ref 10–40)
BASOPHILS # BLD AUTO: 0.01 K/UL — SIGNIFICANT CHANGE UP (ref 0–0.2)
BASOPHILS # BLD AUTO: 0.02 K/UL — SIGNIFICANT CHANGE UP (ref 0–0.2)
BASOPHILS NFR BLD AUTO: 0.1 % — SIGNIFICANT CHANGE UP (ref 0–2)
BASOPHILS NFR BLD AUTO: 0.2 % — SIGNIFICANT CHANGE UP (ref 0–2)
BILIRUB SERPL-MCNC: 0.4 MG/DL — SIGNIFICANT CHANGE UP (ref 0.2–1.2)
BLD GP AB SCN SERPL QL: POSITIVE — SIGNIFICANT CHANGE UP
BUN SERPL-MCNC: 8 MG/DL — SIGNIFICANT CHANGE UP (ref 7–23)
BUN SERPL-MCNC: 8 MG/DL — SIGNIFICANT CHANGE UP (ref 7–23)
CALCIUM SERPL-MCNC: 8.1 MG/DL — LOW (ref 8.4–10.5)
CHLORIDE SERPL-SCNC: 104 MMOL/L — SIGNIFICANT CHANGE UP (ref 96–108)
CHLORIDE SERPL-SCNC: 104 MMOL/L — SIGNIFICANT CHANGE UP (ref 96–108)
CO2 SERPL-SCNC: 20 MMOL/L — LOW (ref 22–31)
CO2 SERPL-SCNC: 21 MMOL/L — LOW (ref 22–31)
CREAT ?TM UR-MCNC: 30 MG/DL — SIGNIFICANT CHANGE UP
CREAT SERPL-MCNC: 0.54 MG/DL — SIGNIFICANT CHANGE UP (ref 0.5–1.3)
CREAT SERPL-MCNC: 0.57 MG/DL — SIGNIFICANT CHANGE UP (ref 0.5–1.3)
EGFR: 125 ML/MIN/1.73M2 — SIGNIFICANT CHANGE UP
EGFR: 125 ML/MIN/1.73M2 — SIGNIFICANT CHANGE UP
EGFR: 126 ML/MIN/1.73M2 — SIGNIFICANT CHANGE UP
EGFR: 126 ML/MIN/1.73M2 — SIGNIFICANT CHANGE UP
EOSINOPHIL # BLD AUTO: 0 K/UL — SIGNIFICANT CHANGE UP (ref 0–0.5)
EOSINOPHIL # BLD AUTO: 0.06 K/UL — SIGNIFICANT CHANGE UP (ref 0–0.5)
EOSINOPHIL NFR BLD AUTO: 0 % — SIGNIFICANT CHANGE UP (ref 0–6)
EOSINOPHIL NFR BLD AUTO: 0.6 % — SIGNIFICANT CHANGE UP (ref 0–6)
FIBRINOGEN PPP-MCNC: 456 MG/DL — HIGH (ref 200–445)
GLUCOSE SERPL-MCNC: 89 MG/DL — SIGNIFICANT CHANGE UP (ref 70–99)
GLUCOSE SERPL-MCNC: 97 MG/DL — SIGNIFICANT CHANGE UP (ref 70–99)
HBV SURFACE AG SER-ACNC: SIGNIFICANT CHANGE UP
HCT VFR BLD CALC: 33.3 % — LOW (ref 34.5–45)
HCT VFR BLD CALC: 34.5 % — SIGNIFICANT CHANGE UP (ref 34.5–45)
HGB BLD-MCNC: 10.5 G/DL — LOW (ref 11.5–15.5)
HGB BLD-MCNC: 10.9 G/DL — LOW (ref 11.5–15.5)
IMM GRANULOCYTES NFR BLD AUTO: 0.5 % — SIGNIFICANT CHANGE UP (ref 0–0.9)
IMM GRANULOCYTES NFR BLD AUTO: 0.6 % — SIGNIFICANT CHANGE UP (ref 0–0.9)
LDH SERPL L TO P-CCNC: SIGNIFICANT CHANGE UP U/L (ref 50–242)
LYMPHOCYTES # BLD AUTO: 1.84 K/UL — SIGNIFICANT CHANGE UP (ref 1–3.3)
LYMPHOCYTES # BLD AUTO: 14.1 % — SIGNIFICANT CHANGE UP (ref 13–44)
LYMPHOCYTES # BLD AUTO: 2.32 K/UL — SIGNIFICANT CHANGE UP (ref 1–3.3)
LYMPHOCYTES # BLD AUTO: 23.5 % — SIGNIFICANT CHANGE UP (ref 13–44)
MAGNESIUM SERPL-MCNC: 4.6 MG/DL — HIGH (ref 1.6–2.6)
MAGNESIUM SERPL-MCNC: 5.7 MG/DL — HIGH (ref 1.6–2.6)
MAGNESIUM SERPL-MCNC: 5.9 MG/DL — HIGH (ref 1.6–2.6)
MCHC RBC-ENTMCNC: 26.2 PG — LOW (ref 27–34)
MCHC RBC-ENTMCNC: 26.2 PG — LOW (ref 27–34)
MCHC RBC-ENTMCNC: 31.5 G/DL — LOW (ref 32–36)
MCHC RBC-ENTMCNC: 31.6 G/DL — LOW (ref 32–36)
MCV RBC AUTO: 82.9 FL — SIGNIFICANT CHANGE UP (ref 80–100)
MCV RBC AUTO: 83 FL — SIGNIFICANT CHANGE UP (ref 80–100)
MONOCYTES # BLD AUTO: 0.56 K/UL — SIGNIFICANT CHANGE UP (ref 0–0.9)
MONOCYTES # BLD AUTO: 0.59 K/UL — SIGNIFICANT CHANGE UP (ref 0–0.9)
MONOCYTES NFR BLD AUTO: 4.3 % — SIGNIFICANT CHANGE UP (ref 2–14)
MONOCYTES NFR BLD AUTO: 6 % — SIGNIFICANT CHANGE UP (ref 2–14)
NEUTROPHILS # BLD AUTO: 10.55 K/UL — HIGH (ref 1.8–7.4)
NEUTROPHILS # BLD AUTO: 6.84 K/UL — SIGNIFICANT CHANGE UP (ref 1.8–7.4)
NEUTROPHILS NFR BLD AUTO: 69.2 % — SIGNIFICANT CHANGE UP (ref 43–77)
NEUTROPHILS NFR BLD AUTO: 80.9 % — HIGH (ref 43–77)
NRBC BLD AUTO-RTO: 0 /100 WBCS — SIGNIFICANT CHANGE UP (ref 0–0)
NRBC BLD AUTO-RTO: 0 /100 WBCS — SIGNIFICANT CHANGE UP (ref 0–0)
PLATELET # BLD AUTO: 179 K/UL — SIGNIFICANT CHANGE UP (ref 150–400)
PLATELET # BLD AUTO: 202 K/UL — SIGNIFICANT CHANGE UP (ref 150–400)
POTASSIUM SERPL-MCNC: 3.8 MMOL/L — SIGNIFICANT CHANGE UP (ref 3.5–5.3)
POTASSIUM SERPL-MCNC: 4.2 MMOL/L — SIGNIFICANT CHANGE UP (ref 3.5–5.3)
POTASSIUM SERPL-SCNC: 3.8 MMOL/L — SIGNIFICANT CHANGE UP (ref 3.5–5.3)
POTASSIUM SERPL-SCNC: 4.2 MMOL/L — SIGNIFICANT CHANGE UP (ref 3.5–5.3)
PROT ?TM UR-MCNC: 5 MG/DL — SIGNIFICANT CHANGE UP (ref 0–12)
PROT SERPL-MCNC: 6.4 G/DL — SIGNIFICANT CHANGE UP (ref 6–8.3)
PROT SERPL-MCNC: 6.7 G/DL — SIGNIFICANT CHANGE UP (ref 6–8.3)
RBC # BLD: 4.16 M/UL — SIGNIFICANT CHANGE UP (ref 3.8–5.2)
RBC # FLD: 14.3 % — SIGNIFICANT CHANGE UP (ref 10.3–14.5)
RBC # FLD: 14.6 % — HIGH (ref 10.3–14.5)
RH IG SCN BLD-IMP: POSITIVE — SIGNIFICANT CHANGE UP
RH IG SCN BLD-IMP: POSITIVE — SIGNIFICANT CHANGE UP
SODIUM SERPL-SCNC: 136 MMOL/L — SIGNIFICANT CHANGE UP (ref 135–145)
SODIUM SERPL-SCNC: 136 MMOL/L — SIGNIFICANT CHANGE UP (ref 135–145)
URATE SERPL-MCNC: 4.5 MG/DL — SIGNIFICANT CHANGE UP (ref 2.5–7)
WBC # BLD: 13.04 K/UL — HIGH (ref 3.8–10.5)
WBC # BLD: 9.88 K/UL — SIGNIFICANT CHANGE UP (ref 3.8–10.5)
WBC # FLD AUTO: 13.04 K/UL — HIGH (ref 3.8–10.5)
WBC # FLD AUTO: 9.88 K/UL — SIGNIFICANT CHANGE UP (ref 3.8–10.5)

## 2025-03-16 PROCEDURE — 86077 PHYS BLOOD BANK SERV XMATCH: CPT

## 2025-03-16 RX ORDER — OXYCODONE HYDROCHLORIDE 30 MG/1
5 TABLET ORAL
Refills: 0 | Status: DISCONTINUED | OUTPATIENT
Start: 2025-03-16 | End: 2025-03-18

## 2025-03-16 RX ORDER — CITRIC ACID/SODIUM CITRATE 300-500 MG
15 SOLUTION, ORAL ORAL EVERY 6 HOURS
Refills: 0 | Status: DISCONTINUED | OUTPATIENT
Start: 2025-03-16 | End: 2025-03-16

## 2025-03-16 RX ORDER — HYDROCORTISONE 10 MG/G
1 CREAM TOPICAL EVERY 6 HOURS
Refills: 0 | Status: DISCONTINUED | OUTPATIENT
Start: 2025-03-16 | End: 2025-03-18

## 2025-03-16 RX ORDER — PRENATAL 136/IRON/FOLIC ACID 27 MG-1 MG
1 TABLET ORAL DAILY
Refills: 0 | Status: DISCONTINUED | OUTPATIENT
Start: 2025-03-16 | End: 2025-03-18

## 2025-03-16 RX ORDER — SIMETHICONE 80 MG
80 TABLET,CHEWABLE ORAL EVERY 4 HOURS
Refills: 0 | Status: DISCONTINUED | OUTPATIENT
Start: 2025-03-16 | End: 2025-03-18

## 2025-03-16 RX ORDER — OXYCODONE HYDROCHLORIDE 30 MG/1
5 TABLET ORAL ONCE
Refills: 0 | Status: DISCONTINUED | OUTPATIENT
Start: 2025-03-16 | End: 2025-03-18

## 2025-03-16 RX ORDER — INFLUENZA A VIRUS A/IDAHO/07/2018 (H1N1) ANTIGEN (MDCK CELL DERIVED, PROPIOLACTONE INACTIVATED, INFLUENZA A VIRUS A/INDIANA/08/2018 (H3N2) ANTIGEN (MDCK CELL DERIVED, PROPIOLACTONE INACTIVATED), INFLUENZA B VIRUS B/SINGAPORE/INFTT-16-0610/2016 ANTIGEN (MDCK CELL DERIVED, PROPIOLACTONE INACTIVATED), INFLUENZA B VIRUS B/IOWA/06/2017 ANTIGEN (MDCK CELL DERIVED, PROPIOLACTONE INACTIVATED) 15; 15; 15; 15 UG/.5ML; UG/.5ML; UG/.5ML; UG/.5ML
0.5 INJECTION, SUSPENSION INTRAMUSCULAR ONCE
Refills: 0 | Status: DISCONTINUED | OUTPATIENT
Start: 2025-03-16 | End: 2025-03-18

## 2025-03-16 RX ORDER — WITCH HAZEL LEAF
1 FLUID EXTRACT MISCELLANEOUS EVERY 4 HOURS
Refills: 0 | Status: DISCONTINUED | OUTPATIENT
Start: 2025-03-16 | End: 2025-03-18

## 2025-03-16 RX ORDER — MAGNESIUM HYDROXIDE 400 MG/5ML
30 SUSPENSION ORAL
Refills: 0 | Status: DISCONTINUED | OUTPATIENT
Start: 2025-03-16 | End: 2025-03-18

## 2025-03-16 RX ORDER — CITRIC ACID/SODIUM CITRATE 300-500 MG
15 SOLUTION, ORAL ORAL EVERY 6 HOURS
Refills: 0 | Status: DISCONTINUED | OUTPATIENT
Start: 2025-03-16 | End: 2025-03-17

## 2025-03-16 RX ORDER — OXYTOCIN-SODIUM CHLORIDE 0.9% IV SOLN 30 UNIT/500ML 30-0.9/5 UT/ML-%
167 SOLUTION INTRAVENOUS
Qty: 30 | Refills: 0 | Status: DISCONTINUED | OUTPATIENT
Start: 2025-03-16 | End: 2025-03-17

## 2025-03-16 RX ORDER — MAGNESIUM SULFATE 500 MG/ML
4 SYRINGE (ML) INJECTION ONCE
Refills: 0 | Status: COMPLETED | OUTPATIENT
Start: 2025-03-16 | End: 2025-03-16

## 2025-03-16 RX ORDER — OXYTOCIN-SODIUM CHLORIDE 0.9% IV SOLN 30 UNIT/500ML 30-0.9/5 UT/ML-%
10 SOLUTION INTRAVENOUS ONCE
Refills: 0 | Status: DISCONTINUED | OUTPATIENT
Start: 2025-03-16 | End: 2025-03-16

## 2025-03-16 RX ORDER — MAGNESIUM SULFATE 500 MG/ML
1.5 SYRINGE (ML) INJECTION
Qty: 40 | Refills: 0 | Status: DISCONTINUED | OUTPATIENT
Start: 2025-03-16 | End: 2025-03-17

## 2025-03-16 RX ORDER — IBUPROFEN 200 MG
600 TABLET ORAL EVERY 6 HOURS
Refills: 0 | Status: COMPLETED | OUTPATIENT
Start: 2025-03-16 | End: 2026-02-12

## 2025-03-16 RX ORDER — PRAMOXINE HCL 1 %
1 GEL (GRAM) TOPICAL EVERY 4 HOURS
Refills: 0 | Status: DISCONTINUED | OUTPATIENT
Start: 2025-03-16 | End: 2025-03-18

## 2025-03-16 RX ORDER — AMPICILLIN SODIUM 1 G/1
1 INJECTION, POWDER, FOR SOLUTION INTRAMUSCULAR; INTRAVENOUS EVERY 4 HOURS
Refills: 0 | Status: DISCONTINUED | OUTPATIENT
Start: 2025-03-16 | End: 2025-03-16

## 2025-03-16 RX ORDER — KETOROLAC TROMETHAMINE 30 MG/ML
30 INJECTION, SOLUTION INTRAMUSCULAR; INTRAVENOUS ONCE
Refills: 0 | Status: DISCONTINUED | OUTPATIENT
Start: 2025-03-16 | End: 2025-03-16

## 2025-03-16 RX ORDER — CLOSTRIDIUM TETANI TOXOID ANTIGEN (FORMALDEHYDE INACTIVATED), CORYNEBACTERIUM DIPHTHERIAE TOXOID ANTIGEN (FORMALDEHYDE INACTIVATED), BORDETELLA PERTUSSIS TOXOID ANTIGEN (GLUTARALDEHYDE INACTIVATED), BORDETELLA PERTUSSIS FILAMENTOUS HEMAGGLUTININ ANTIGEN (FORMALDEHYDE INACTIVATED), BORDETELLA PERTUSSIS PERTACTIN ANTIGEN, AND BORDETELLA PERTUSSIS FIMBRIAE 2/3 ANTIGEN 5; 2; 2.5; 5; 3; 5 [LF]/.5ML; [LF]/.5ML; UG/.5ML; UG/.5ML; UG/.5ML; UG/.5ML
0.5 INJECTION, SUSPENSION INTRAMUSCULAR ONCE
Refills: 0 | Status: DISCONTINUED | OUTPATIENT
Start: 2025-03-16 | End: 2025-03-18

## 2025-03-16 RX ORDER — BENZOCAINE 220 MG/G
1 SPRAY, METERED PERIODONTAL EVERY 6 HOURS
Refills: 0 | Status: DISCONTINUED | OUTPATIENT
Start: 2025-03-16 | End: 2025-03-18

## 2025-03-16 RX ORDER — OXYTOCIN-SODIUM CHLORIDE 0.9% IV SOLN 30 UNIT/500ML 30-0.9/5 UT/ML-%
SOLUTION INTRAVENOUS
Qty: 30 | Refills: 0 | Status: DISCONTINUED | OUTPATIENT
Start: 2025-03-16 | End: 2025-03-16

## 2025-03-16 RX ORDER — AMPICILLIN SODIUM 1 G/1
2 INJECTION, POWDER, FOR SOLUTION INTRAMUSCULAR; INTRAVENOUS ONCE
Refills: 0 | Status: COMPLETED | OUTPATIENT
Start: 2025-03-16 | End: 2025-03-16

## 2025-03-16 RX ORDER — OXYTOCIN-SODIUM CHLORIDE 0.9% IV SOLN 30 UNIT/500ML 30-0.9/5 UT/ML-%
167 SOLUTION INTRAVENOUS
Qty: 30 | Refills: 0 | Status: DISCONTINUED | OUTPATIENT
Start: 2025-03-16 | End: 2025-03-16

## 2025-03-16 RX ORDER — MODIFIED LANOLIN 100 %
1 CREAM (GRAM) TOPICAL EVERY 6 HOURS
Refills: 0 | Status: DISCONTINUED | OUTPATIENT
Start: 2025-03-16 | End: 2025-03-18

## 2025-03-16 RX ORDER — ACETAMINOPHEN 500 MG/5ML
975 LIQUID (ML) ORAL
Refills: 0 | Status: DISCONTINUED | OUTPATIENT
Start: 2025-03-16 | End: 2025-03-18

## 2025-03-16 RX ORDER — DIBUCAINE 10 MG/G
1 OINTMENT TOPICAL EVERY 6 HOURS
Refills: 0 | Status: DISCONTINUED | OUTPATIENT
Start: 2025-03-16 | End: 2025-03-18

## 2025-03-16 RX ORDER — DIPHENHYDRAMINE HCL 12.5MG/5ML
25 ELIXIR ORAL EVERY 6 HOURS
Refills: 0 | Status: DISCONTINUED | OUTPATIENT
Start: 2025-03-16 | End: 2025-03-18

## 2025-03-16 RX ORDER — ACETAMINOPHEN 500 MG/5ML
1000 LIQUID (ML) ORAL ONCE
Refills: 0 | Status: COMPLETED | OUTPATIENT
Start: 2025-03-16 | End: 2025-03-16

## 2025-03-16 RX ORDER — OXYTOCIN-SODIUM CHLORIDE 0.9% IV SOLN 30 UNIT/500ML 30-0.9/5 UT/ML-%
167 SOLUTION INTRAVENOUS
Qty: 30 | Refills: 0 | Status: DISCONTINUED | OUTPATIENT
Start: 2025-03-16 | End: 2025-03-18

## 2025-03-16 RX ORDER — FENTANYL/BUPIVACAINE/NS/PF 2MCG/ML-.1
250 PLASTIC BAG, INJECTION (ML) INJECTION
Refills: 0 | Status: DISCONTINUED | OUTPATIENT
Start: 2025-03-16 | End: 2025-03-16

## 2025-03-16 RX ORDER — SODIUM CHLORIDE 9 G/1000ML
1000 INJECTION, SOLUTION INTRAVENOUS
Refills: 0 | Status: DISCONTINUED | OUTPATIENT
Start: 2025-03-16 | End: 2025-03-16

## 2025-03-16 RX ADMIN — AMPICILLIN SODIUM 216 GRAM(S): 1 INJECTION, POWDER, FOR SOLUTION INTRAMUSCULAR; INTRAVENOUS at 04:55

## 2025-03-16 RX ADMIN — SODIUM CHLORIDE 75 MILLILITER(S): 9 INJECTION, SOLUTION INTRAVENOUS at 08:59

## 2025-03-16 RX ADMIN — Medication 1000 MILLIGRAM(S): at 12:05

## 2025-03-16 RX ADMIN — OXYTOCIN-SODIUM CHLORIDE 0.9% IV SOLN 30 UNIT/500ML 2 MILLIUNIT(S)/MIN: 30-0.9/5 SOLUTION at 07:41

## 2025-03-16 RX ADMIN — Medication 50 GM/HR: at 04:52

## 2025-03-16 RX ADMIN — Medication 250 MILLILITER(S): at 09:14

## 2025-03-16 RX ADMIN — KETOROLAC TROMETHAMINE 30 MILLIGRAM(S): 30 INJECTION, SOLUTION INTRAMUSCULAR; INTRAVENOUS at 21:05

## 2025-03-16 RX ADMIN — Medication 400 MILLIGRAM(S): at 11:50

## 2025-03-16 RX ADMIN — Medication 300 GRAM(S): at 04:30

## 2025-03-16 RX ADMIN — AMPICILLIN SODIUM 108 GRAM(S): 1 INJECTION, POWDER, FOR SOLUTION INTRAMUSCULAR; INTRAVENOUS at 13:40

## 2025-03-16 RX ADMIN — SODIUM CHLORIDE 125 MILLILITER(S): 9 INJECTION, SOLUTION INTRAVENOUS at 04:30

## 2025-03-16 RX ADMIN — AMPICILLIN SODIUM 108 GRAM(S): 1 INJECTION, POWDER, FOR SOLUTION INTRAMUSCULAR; INTRAVENOUS at 17:09

## 2025-03-16 RX ADMIN — AMPICILLIN SODIUM 108 GRAM(S): 1 INJECTION, POWDER, FOR SOLUTION INTRAMUSCULAR; INTRAVENOUS at 08:59

## 2025-03-16 NOTE — OB NEONATOLOGY/PEDIATRICIAN DELIVERY SUMMARY - NSPEDSNEONOTESA_OBGYN_ALL_OB_FT
NICU called to delivery of  for category II tracing, PEC (mom on Mg). Nuchal cord x1. Shoulder dystocia. Infant emerged with poor tone, no cry. Cord clamped and infant brought to radiant warmer for routine resuscitation including warm/dry/suction/stim. Infant with no respiratory effort and poor tone. PPV initiated around 30 seconds of life. NRR called. HR >100. FiO2 increased to 100%. Pulse ox placed. Infant with weak cry around 1 minute and 30 seconds of life. PPV continued until about 2 minutes of life until sustained respiratory effort. Infant then transitioned to room air. Infant vigorous with strong cry. O2 saturations >95%. On examination, infant noted to have decreased spontaneous movement of right arm with assymmetric irene reflex (L>R). Pulses palpated bilaterally in upper extremity. Palmar grasp intact. Positive crepitus noted over right clavicle on exam. Parents updated on infants status. Pediatric Hospitalist made aware. Stable for WBN at this time.

## 2025-03-16 NOTE — OB RN TRIAGE NOTE - CHIEF COMPLAINT QUOTE
"I started sweating, seeing spots, blurry vision, SOB, dizziness, elevated /95 and HA around 2300, called my OB and was told to come in".

## 2025-03-16 NOTE — OB PROVIDER LABOR PROGRESS NOTE - NS_SUBJECTIVE/OBJECTIVE_OBGYN_ALL_OB_FT
balloon placed  VE 1/long  145 mod kalie +accel -decel  no ctx  cat I tracing reassuring acid base status  Mg started at 430   Amp for GBS ppx   To start pitocin momentarily

## 2025-03-16 NOTE — OB PROVIDER DELIVERY SUMMARY - NSANESTHESIALABOR_OBGYN_ALL_OB
Bed: 17  Expected date:   Expected time:   Means of arrival:   Comments:  37 F from home  Nausea and 1x vomiting, nausea has improved  +dizziness  Hx Cerebral palsy  124/80 86 16 99%    Epidural

## 2025-03-16 NOTE — OB PROVIDER LABOR PROGRESS NOTE - NS_SUBJECTIVE/OBJECTIVE_OBGYN_ALL_OB_FT
The patient endorses vaginal pressure and pain with ctx  VE: 4/50/-3  FHT: baseline 150 bpm, moderate variability, +accels, - decels, cat I  [place]: q2-4 min  Pitocin is on 10 miu  Will continue to titrate pitocin as tollerable

## 2025-03-16 NOTE — OB PROVIDER H&P - HISTORY OF PRESENT ILLNESS
Patient is   29y  at 37w2d p/w elevated BP with systolics >90, shown to me via photos. She also has vision changes she states she sees some wavy lines, white spots floating, consistent with scotoma. Denies HA, CP, RUQ/Epigastric pain. Had episode of sudden SOB earlier but has resolved.   - LOF - VB - CTX +FM    Ante: Spontaneous pregnancy. NIPT and anatomy scan wnl. GDMA1 - FS controlled fasting <90, 2H PP <120. Had elevated BP on last triage visit and in office per attending   EFW 3575 94%tile AC 97%tile   GBS positive   OBHx: SAB x 3,   c/b gDMA1. 2700g at 39 weeks.   GYNHx: denies fibroids, cysts,  abnormal pap, STIs.     PMH: denies  PSH: l/s R cystectomy , abdominoplasty    Meds: PNV  Allergies: NKDA      Physical Exam:  T(C): 36.4 (25 @ 03:11), Max: 36.4 (25 @ 03:11)  HR: 106 (25 @ 03:11) (106 - 106)  BP: 128/86 (25 @ 03:11) (128/86 - 128/86)  RR: 16 (25 @ 03:11) (16 - 16)  SpO2: 100% (25 @ 03:11) (100% - 100%)    General: NAD  Pulm: no increased WOB, CTABL   CV: NRR S1 S2  Abdomen: soft, gravid, nontender, no RUQ/epigastric pain   Extremities: wnl     TAUS: Cephalic, 8/8 BPP CRISTINE 12   VE: ft/long     EFM: 150 bpm, mod variability, + accels, - decels; reactive and reassuring  [place]: no ctx     Patient is a 30 y/o  at 37w2d admitted for IOL for PECwSF    PLAN  - Admit to L&D  - IV Mg ordered  - Consent signed for VD and IOL   - NPO  - IV fluids and labs ordered  - GBS +, amp ordered for infection ppx  - Continuous EFM       Davina Alcala, PGY 1  Discussed with Dr. Gatica and Dr. Villalpando PGY3

## 2025-03-16 NOTE — OB PROVIDER LABOR PROGRESS NOTE - ASSESSMENT
Tracing Cat II however overall remains reassuring with mod kalie  Will continue spinning baby circuit  Will plan for re-examination at 7pm and will place IUPC at that time if no significant harris to assess ctx adequacy.  Tracing Cat II however overall remains reassuring with mod kalie  Will continue spinning baby circuit  Will plan for re-examination at 7pm and will place IUPC at that time if no significant harris to assess ctx adequacy.   Continue IV Mg 2G/hr, continue to monitor Mg level q6h

## 2025-03-16 NOTE — OB PROVIDER DELIVERY SUMMARY - NSMODPPHRISK_OBGYN_A_OB
Over-distended uterus: Multiple gestation, polyhydramnios, Macrosomia Over-distended uterus: Multiple gestation, polyhydramnios, Macrosomia/BMI > 40

## 2025-03-16 NOTE — OB NEONATOLOGY/PEDIATRICIAN DELIVERY SUMMARY - NS_BIRTHTRAUMAOTHERA_OBGYN_ALL_OB_FT
On examination, infant noted to have decreased spontaneous movement of right arm with assymmetric irene reflex (L>R). Pulses palpated bilaterally in upper extremity. Palmar grasp intact. Positive crepitus noted over right clavicle on exam. Parents updated on infants status. Pediatric Hospitalist made aware. Stable for WBN at this time.

## 2025-03-16 NOTE — OB RN DELIVERY SUMMARY - NS_SEPSISRSKCALC_OBGYN_ALL_OB_FT
EOS calculated successfully. EOS Risk Factor: 0.5/1000 live births ([facility] [place] national incidence); GA=37w2d; Temp=98.3; ROM=7.417; GBS='Positive'; Antibiotics='GBS specific antibiotics > 2 hrs prior to birth'

## 2025-03-16 NOTE — OB PROVIDER DELIVERY SUMMARY - NSPROVIDERDELIVERYNOTE_OBGYN_ALL_OB_FT
28 yo  presented at 37w2d EGA for induction of labor for PEC w/ SF on 3/16/2025. Labor was induced with cervical balloon and pitocin. Patient received an epidural for pain control. Amniotomy was performed with clear fluid. She progressed to fully dilated and pushed effectively. Delivery complicated by shoulder dystocia; this was reduced with suprapubic pressure and Mariela manuevuer. Clavicle fracture noted by pediatrics. Nuchal chord x1. Placenta delivered spontaneously intact with 3 vessel cord. No lacerations noted. Excellent hemostasis. . Patient tolerated procedure well. Mother and infant in stable conditions. Dr. Gatica present throughout procedure. 30 yo  presented at 37w2d EGA for induction of labor for PEC w/ SF on 3/16/2025. Labor was induced with cervical balloon and pitocin. Patient received an epidural for pain control. Amniotomy was performed with clear fluid. She progressed to fully dilated and pushed effectively. Delivery complicated by shoulder dystocia; this was reduced with suprapubic pressure and Mariela manuevuer. Clavicle fracture noted by pediatrics. Nuchal chord x1, reduced prior to delivery. Placenta delivered spontaneously intact with 3 vessel cord. No lacerations noted. Excellent hemostasis. . Patient tolerated procedure well. Mother and infant in stable conditions. Dr. Gatica present throughout procedure.

## 2025-03-16 NOTE — OB PROVIDER LABOR PROGRESS NOTE - NS_OBIHICONTRACTIONPATTERNDETAILS_OBGYN_ALL_OB_FT
q2min
ctx q2 minutes
ctx q2-4 minutes
ctx q2-5 minutes
ctx irregular, 1 in 20 minutes
ctx q2-4 minutes

## 2025-03-16 NOTE — OB PROVIDER LABOR PROGRESS NOTE - NS_SUBJECTIVE/OBJECTIVE_OBGYN_ALL_OB_FT
The patient is lying in bed with epidural  Reports feeling more constant pressure  VE: 6/80/-2  FHT: baseline 140 bpm, moderate variability, +accels, -decels cat1  [place]: q2-3 min  pitocin is on 18 miu  Will continue current management

## 2025-03-16 NOTE — OB PROVIDER DELIVERY SUMMARY - NSANESTHESIAVD_OBGYN_ALL_OB
Patient had her physical done yesterday 12/6/23. OFFICE STAFF=please assists. From: Vicenta Medrano  To: Madelaine Rehman  Sent: 12/6/2023  2:52 PM CST  Subject: Health Form    Hi,     I'm here for my appointment. I forgot to bring a copy of the attached form. It is required for work. Thanks!   Bryan Mediate Epidural

## 2025-03-16 NOTE — OB PROVIDER LABOR PROGRESS NOTE - NS_OBIHIFHRDETAILS_OBGYN_ALL_OB_FT
baseline 135, mod kalie, +Accels, +intermittent variable decelerations; Cat II tracing however overall reassuring
baseline 135, mod kalie, +accels, +intermittent decels, nonrecurrent; Cat II tracing however overall reassuring with mod kalie
baseline 130, mod kalie, +accels, -decels; Cat I tracing
baseline 135, mod kalie, +Accels, -decels; Cat I tracing
baseline 140, mod kalie, +Accels, -decels; Cat I tracing
Category 1

## 2025-03-16 NOTE — OB PROVIDER DELIVERY SUMMARY - NSSELHIDDEN_OBGYN_ALL_OB_FT
[NS_DeliveryAttending1_OBGYN_ALL_OB_FT:[id number]],[NS_DeliveryAssist1_OBGYN_ALL_OB_FT:[id number]]

## 2025-03-16 NOTE — OB PROVIDER LABOR PROGRESS NOTE - ASSESSMENT
Attending en route to hospital to begin pushing  Will continue to monitor patient at the bedside  Tracing overall reassuring with mod kalie  Anticipate

## 2025-03-16 NOTE — PRE-ANESTHESIA EVALUATION ADULT - NSANTHPMHFT_GEN_ALL_CORE
29y  at 37w2d p/w elevated BP with systolics >90, shown to me via photos. She also has vision changes she states she sees some wavy lines, white spots floating, consistent with scotoma. Denies HA, CP, RUQ/Epigastric pain. Had episode of sudden SOB earlier but has resolved.   - LOF - VB - CTX +FM    Ante: Spontaneous pregnancy. NIPT and anatomy scan wnl. GDMA1 - FS controlled fasting <90, 2H PP <120. Had elevated BP on last triage visit and in office per attending   EFW 3575 94%tile AC 97%tile   GBS positive   OBHx: SAB x 3,  2014 c/b gDMA1. 2700g at 39 weeks.   GYNHx: denies fibroids, cysts,  abnormal pap, STIs.     PMH: denies  PSH: l/s R cystectomy , abdominoplasty    Meds: PNV  Allergies: NKDA

## 2025-03-16 NOTE — OB RN PATIENT PROFILE - FALL HARM RISK - UNIVERSAL INTERVENTIONS
Bed in lowest position, wheels locked, appropriate side rails in place/Call bell, personal items and telephone in reach/Instruct patient to call for assistance before getting out of bed or chair/Non-slip footwear when patient is out of bed/[place] to call system/Physically safe environment - no spills, clutter or unnecessary equipment/Purposeful Proactive Rounding/Room/bathroom lighting operational, light cord in reach

## 2025-03-16 NOTE — OB PROVIDER LABOR PROGRESS NOTE - NS_SUBJECTIVE/OBJECTIVE_OBGYN_ALL_OB_FT
Patient seen at the bedside after epidural redone by anesthesia, noted to have 1 minute decelerations x2   SVE: unchanged from prior  Patient now with more relief  Pitocin on at 22mu  RN at the bedside beginning spinning baby circuit Patient seen at the bedside after epidural redone by anesthesia, noted to have 1 minute decelerations x2   SVE: unchanged from prior  Patient now with more relief  Pitocin on at 22mu  RN at the bedside beginning spinning baby circuit  Clinical magnesium check performed. Reflexes 2+ b/l. No toxic complaints at this time.

## 2025-03-16 NOTE — OB PROVIDER LABOR PROGRESS NOTE - NS_SUBJECTIVE/OBJECTIVE_OBGYN_ALL_OB_FT
Pt resting comfortably in bed with epidural. AROM clear fluid. S/p Tylenol and headache now improving, reports 4/10 in pain. Will continue to monitor.

## 2025-03-16 NOTE — OB RN DELIVERY SUMMARY - NSSELHIDDEN_OBGYN_ALL_OB_FT
[NS_[email or web address]_ALL_OB_FT:[id number]],[NS_DeliveryAssist1_OBGYN_ALL_OB_FT:[id number]],[NS_DeliveryAssist2_OBGYN_ALL_OB_FT:[id number]],[NS_DeliveryRN_OBGYN_ALL_OB_FT:[id number]]

## 2025-03-16 NOTE — OB RN PATIENT PROFILE - HIV: DATE, OB PROFILE
Women and Heart Disease: Tips for Making Small Changes  Making even one lifestyle change for your heart reduces your risk for heart disease. Change is hard for everyone, so take it one step at a time. Here are some tips to help you get started on making changes that are good for your heart.    Make a plan  Trying to do too much too fast can end in failure.  · Start by writing down all the things you’d like to do to lower your risks.  · Break each one into small steps. If you said, “Cut down on fat,” a small step could be to use fruit spread instead of butter on your toast. Or have soup and a roll for lunch instead of going out for a hamburger and fries.  · Decide which step you’d like to take first. Then choose a second and a third step.  · Check off each step as you achieve it. Add new steps as you go along.  · Set a deadline to meet each of your steps and help you stick with the new rule you have made for yourself.  · If a step isn’t working, try another. Come back to the first one later.  Keep records  Keeping records helps you know your habits and see your successes.  · Keeping an exercise record can help you see your progress and keep you going. Try logging your activities every week. This will give you a chance to look back at the end of the week and make any changes needed.  · Keeping food records can help you see your eating patterns and plan ways to make small changes. Try writing down the foods you eat each day. You will likely find it easier to be more consistent in making healthy choices.  · Noting when you feel stressed or want to smoke can help you think of ways to avoid these triggers. Write down a list of activities you would rather do to not give into these impulses.  Reward yourself  Making changes isn’t easy. You deserve to reward yourself when you succeed. Just making the change may be its own reward. But why not give yourself an extra pat on the back?  · Give yourself something special you’ve  been wanting.  · Do something that you’ve always promised yourself you’d do, such as going dancing.  · Treat yourself to an activity you'll enjoy after a successful week.  [employer] last reviewed this educational content on 7/1/2019  © 3388-0411 [employer]. [address] [place] [address], [place] [address]. All rights reserved. This information is not intended as a substitute for professional medical care. Always follow your healthcare professional's instructions.         11-Feb-2025

## 2025-03-16 NOTE — OB PROVIDER LABOR PROGRESS NOTE - NS_SUBJECTIVE/OBJECTIVE_OBGYN_ALL_OB_FT
EFM reviewed  Patient seen at the bedside  Balloon now out  SVE: 4/50/-3  Epidural in situ EFM reviewed  Patient seen at the bedside  Balloon now out  SVE: 4/50/-3  Epidural in situ  Clinical Mg check performed. Reflexes 2+ b/l. No toxic complaints at this time.

## 2025-03-16 NOTE — OB RN TRIAGE NOTE - FALL HARM RISK - UNIVERSAL INTERVENTIONS
Bed in lowest position, wheels locked, appropriate side rails in place/Call bell, personal items and telephone in reach/Instruct patient to call for assistance before getting out of bed or chair/Non-slip footwear when patient is out of bed/[place] to call system/Physically safe environment - no spills, clutter or unnecessary equipment/Purposeful Proactive Rounding/Room/bathroom lighting operational, light cord in reach

## 2025-03-17 LAB
ADD ON TEST-SPECIMEN IN LAB: SIGNIFICANT CHANGE UP
BASOPHILS # BLD AUTO: 0.02 K/UL — SIGNIFICANT CHANGE UP (ref 0–0.2)
BASOPHILS NFR BLD AUTO: 0.1 % — SIGNIFICANT CHANGE UP (ref 0–2)
EOSINOPHIL # BLD AUTO: 0 K/UL — SIGNIFICANT CHANGE UP (ref 0–0.5)
EOSINOPHIL NFR BLD AUTO: 0 % — SIGNIFICANT CHANGE UP (ref 0–6)
HCT VFR BLD CALC: 32 % — LOW (ref 34.5–45)
HGB BLD-MCNC: 10.1 G/DL — LOW (ref 11.5–15.5)
IMM GRANULOCYTES NFR BLD AUTO: 0.5 % — SIGNIFICANT CHANGE UP (ref 0–0.9)
LYMPHOCYTES # BLD AUTO: 1.02 K/UL — SIGNIFICANT CHANGE UP (ref 1–3.3)
LYMPHOCYTES # BLD AUTO: 6.2 % — LOW (ref 13–44)
MAGNESIUM SERPL-MCNC: 4.8 MG/DL — HIGH (ref 1.6–2.6)
MAGNESIUM SERPL-MCNC: 6 MG/DL — HIGH (ref 1.6–2.6)
MCHC RBC-ENTMCNC: 26.1 PG — LOW (ref 27–34)
MCHC RBC-ENTMCNC: 31.6 G/DL — LOW (ref 32–36)
MCV RBC AUTO: 82.7 FL — SIGNIFICANT CHANGE UP (ref 80–100)
MONOCYTES # BLD AUTO: 0.55 K/UL — SIGNIFICANT CHANGE UP (ref 0–0.9)
MONOCYTES NFR BLD AUTO: 3.4 % — SIGNIFICANT CHANGE UP (ref 2–14)
NEUTROPHILS # BLD AUTO: 14.72 K/UL — HIGH (ref 1.8–7.4)
NEUTROPHILS NFR BLD AUTO: 89.8 % — HIGH (ref 43–77)
NRBC BLD AUTO-RTO: 0 /100 WBCS — SIGNIFICANT CHANGE UP (ref 0–0)
PLATELET # BLD AUTO: 177 K/UL — SIGNIFICANT CHANGE UP (ref 150–400)
RBC # BLD: 3.87 M/UL — SIGNIFICANT CHANGE UP (ref 3.8–5.2)
RBC # FLD: 14.6 % — HIGH (ref 10.3–14.5)
RUBV IGG SER-ACNC: 0.75 INDEX — SIGNIFICANT CHANGE UP
RUBV IGG SER-IMP: NEGATIVE — SIGNIFICANT CHANGE UP
T PALLIDUM AB TITR SER: NEGATIVE — SIGNIFICANT CHANGE UP
T PALLIDUM AB TITR SER: NEGATIVE — SIGNIFICANT CHANGE UP
WBC # BLD: 16.4 K/UL — HIGH (ref 3.8–10.5)
WBC # FLD AUTO: 16.4 K/UL — HIGH (ref 3.8–10.5)

## 2025-03-17 RX ORDER — SODIUM CHLORIDE 9 G/1000ML
1000 INJECTION, SOLUTION INTRAVENOUS
Refills: 0 | Status: DISCONTINUED | OUTPATIENT
Start: 2025-03-17 | End: 2025-03-17

## 2025-03-17 RX ORDER — CALCIUM CARBONATE 750 MG/1
1 TABLET ORAL ONCE
Refills: 0 | Status: COMPLETED | OUTPATIENT
Start: 2025-03-17 | End: 2025-03-17

## 2025-03-17 RX ORDER — IBUPROFEN 200 MG
600 TABLET ORAL EVERY 6 HOURS
Refills: 0 | Status: DISCONTINUED | OUTPATIENT
Start: 2025-03-17 | End: 2025-03-18

## 2025-03-17 RX ADMIN — Medication 975 MILLIGRAM(S): at 19:05

## 2025-03-17 RX ADMIN — Medication 600 MILLIGRAM(S): at 07:34

## 2025-03-17 RX ADMIN — Medication 87.5 MILLILITER(S): at 08:10

## 2025-03-17 RX ADMIN — Medication 3 MILLILITER(S): at 13:57

## 2025-03-17 RX ADMIN — Medication 975 MILLIGRAM(S): at 11:10

## 2025-03-17 RX ADMIN — Medication 600 MILLIGRAM(S): at 20:43

## 2025-03-17 RX ADMIN — Medication 975 MILLIGRAM(S): at 00:52

## 2025-03-17 RX ADMIN — Medication 975 MILLIGRAM(S): at 00:32

## 2025-03-17 RX ADMIN — Medication 975 MILLIGRAM(S): at 23:27

## 2025-03-17 RX ADMIN — Medication 3 MILLILITER(S): at 21:10

## 2025-03-17 NOTE — CHART NOTE - NSCHARTNOTEFT_GEN_A_CORE
Patient evaluated at bedside for clinical magnesium check.     She denies headache, dizziness, visual disturbances, nausea/vomiting, RUQ pain, epigastric pain, and SOB. Pain well controlled.     T(C): 36.7 (25 @ 18:02), Max: 36.8 (25 @ 08:15)  HR: 80 (25 @ 18:02) (78 - 89)  BP: 135/90 (25 @ 18:02) (113/58 - 135/90)  RR: 18 (25 @ 18:02) (16 - 18)  SpO2: 99% (25 @ 18:02) (98% - 100%)  Wt(kg): --    Gen: Well-appearing. NAD.  Resp: Breathing comfortably on RA. Lungs CTAB.  Abd: Soft, no epigastric or RUQ tenderness.  Ext: Bilateral brachioradialis reflexes +2                          10.1   16.40 )-----------( 177      ( 17 Mar 2025 06:02 )             32.0         132[L]  |  101  |  7   ----------------------------<  143[H]  4.4   |  20[L]  |  0.57    Ca    7.0[L]      17 Mar 2025 04:56  Mg     4.8         TPro  5.8[L]  /  Alb  3.0[L]  /  TBili  0.5  /  DBili  x   /  AST  17  /  ALT  9[L]  /  AlkPhos  122[H]  25 @ 07:01  -  25 @ 07:00  --------------------------------------------------------  IN: 3225 mL / OUT: 3350 mL / NET: -125 mL    25 @ 07:01  -  25 @ 20:14  --------------------------------------------------------  IN: 662.5 mL / OUT: 1350 mL / NET: -687.5 mL      A/P: 29yFemale s/p  complicated by preeclampsia with severe features       1. Preeclampsia:   - Continue IV Magnesium for 24 hrs post delivery.   - Magnesium clinical checks and serum assay q6 hrs.    - No complaints currently.   - BP controlled  2. Neuro: PO pain medications PRN  3. : strict Is and Os    Kecia Ascencio MD PGY1
Pt seen for a magnesium check. She denies HA, vision changes, dizziness, SOB, n/v, RUQ/epigastric pain.     Physical Exam:  T(C): 36.6 (25 @ 02:00), Max: 36.8 (25 @ 05:16)  HR: 89 (25 @ 02:00) (89 - 107)  BP: 104/61 (25 @ 02:00) (99/63 - 125/84)  RR: 18 (25 @ 02:00) (15 - 18)  SpO2: 98% (25 @ 02:00) (97% - 100%)    03-15-25 @ 07:01  -  25 @ 07:00  --------------------------------------------------------  IN: 0 mL / OUT: 475 mL / NET: -475 mL    25 @ 07:01  -  25 @ 04:46  --------------------------------------------------------  IN: 2725 mL / OUT: 2650 mL / NET: 75 mL      General: NAD, AAOx3  Pulm: no increased WOB, lungs clear to auscultation bilaterally  Abd: no tenderness in RUQ/epigastric areas  Extremities: warm/dry/intact skin, brachioradialis reflexes 2+ bilaterally, no edema    A&P:  28 yo  s/p  c/b shoulder dystocia, c/b PEC w/ SF (), seen for a magnesium check.   1. Preeclampsia: Continue IV Magnesium @2G/hr for 24 hrs post delivery until 1930 3/17  Denying toxic symptoms currently.   Antihypertensives: none  Continue to monitor blood pressures.   Next Magnesium check @ 1030  Last Magnesium serum level 5.9. Follow up next level.   2. GI: regular diet  3. : strict Is and Os, D/C richards after discontinuation of IV Magnesium
Patient evaluated at bedside for clinical magnesium check.     She denies headache, dizziness, visual disturbances, nausea/vomiting, RUQ pain, epigastric pain, and SOB. Pain well controlled.     T(C): 36.8 (25 @ 08:15), Max: 36.8 (25 @ 00:00)  HR: 82 (25 @ 08:15) (77 - 98)  BP: 128/76 (25 @ 08:15) (99/63 - 128/76)  RR: 16 (25 @ 08:15) (16 - 18)  SpO2: 100% (25 @ 08:15) (98% - 100%)  Wt(kg): --    Gen: Well-appearing. NAD.  Resp: Breathing comfortably on RA. Lungs CTAB.  Abd: Soft, no epigastric or RUQ tenderness.  _GU: Farley to gravity draining clear urine  Ext: Bilateral brachioradialis reflexes +2                          10.1   16.40 )-----------( 177      ( 17 Mar 2025 06:02 )             32.0         132[L]  |  101  |  7   ----------------------------<  143[H]  4.4   |  20[L]  |  0.57    Ca    7.0[L]      17 Mar 2025 04:56  Mg     6.0         TPro  5.8[L]  /  Alb  3.0[L]  /  TBili  0.5  /  DBili  x   /  AST  17  /  ALT  9[L]  /  AlkPhos  122[H]  25 @ 07:01  -  25 @ 07:00  --------------------------------------------------------  IN: 3225 mL / OUT: 3350 mL / NET: -125 mL        A/P: 30 yo  s/p  c/b shoulder dystocia, c/b PEC w/ SF ()     1. Preeclampsia:   - Continue IV Magnesium @1.5g/hr for 24 hrs post delivery.   - Magnesium clinical checks and serum assay q6 hrs.    - No complaints currently.   - BP controlled  2. Neuro: PO pain medications PRN  3. : strict Is and Os    Kecia Ascencio MD PGY1

## 2025-03-17 NOTE — LACTATION INITIAL EVALUATION - NS LACT CON REASON FOR REQ
Seen dyad at bedside at baby's 19 hours of life. Baby has fracture of right clavicle. Mother's plan is to give baby breastmilk/ to breastfeed the best that she can. However, she's anxious about baby's fracture clavicle condition and prefers not to breastfeed baby now, but to supplement baby with formula. After discussion, Mother agrees to do formula feed and breastpumping now, and to start breastfeeding baby directly when she thinks baby's clavicle condition more stabilized. Triple feeding plan steps educated in detail. Handout was given and explained. Breastpump setup for mother. Usage, pumping strategies and cleaning strategies educated. All questions answered. Tele lactation to followup with mother after discharge./multiparous mom

## 2025-03-17 NOTE — LACTATION INITIAL EVALUATION - LACTATION INTERVENTIONS
initiate/review safe skin-to-skin/initiate/review hand expression/post discharge community resources provided/initiate/review supplementation plan due to medical indications/initiate/review breast massage/compression/reviewed components of an effective feeding and at least 8 effective feedings per day required/reviewed importance of monitoring infant diapers, the breastfeeding log, and minimum output each day/reviewed benefits and recommendations for rooming in/reviewed feeding on demand/by cue at least 8 times a day

## 2025-03-17 NOTE — LACTATION INITIAL EVALUATION - SUCK/SWALLOW
Mother chose not to breastfeed directly at this point. Patient/Caregiver provided printed discharge information.

## 2025-03-18 ENCOUNTER — TRANSCRIPTION ENCOUNTER (OUTPATIENT)
Age: 30
End: 2025-03-18

## 2025-03-18 VITALS
OXYGEN SATURATION: 99 % | HEART RATE: 72 BPM | DIASTOLIC BLOOD PRESSURE: 82 MMHG | TEMPERATURE: 99 F | SYSTOLIC BLOOD PRESSURE: 121 MMHG | RESPIRATION RATE: 18 BRPM

## 2025-03-18 LAB
ALBUMIN SERPL ELPH-MCNC: 3.2 G/DL — LOW (ref 3.3–5)
ALP SERPL-CCNC: 109 U/L — SIGNIFICANT CHANGE UP (ref 40–120)
ALT FLD-CCNC: 11 U/L — SIGNIFICANT CHANGE UP (ref 10–45)
ANION GAP SERPL CALC-SCNC: 10 MMOL/L — SIGNIFICANT CHANGE UP (ref 5–17)
AST SERPL-CCNC: 18 U/L — SIGNIFICANT CHANGE UP (ref 10–40)
BASOPHILS NFR BLD AUTO: 0.3 % — SIGNIFICANT CHANGE UP (ref 0–2)
BILIRUB SERPL-MCNC: 0.3 MG/DL — SIGNIFICANT CHANGE UP (ref 0.2–1.2)
BUN SERPL-MCNC: 11 MG/DL — SIGNIFICANT CHANGE UP (ref 7–23)
CALCIUM SERPL-MCNC: 8.2 MG/DL — LOW (ref 8.4–10.5)
CHLORIDE SERPL-SCNC: 106 MMOL/L — SIGNIFICANT CHANGE UP (ref 96–108)
CO2 SERPL-SCNC: 22 MMOL/L — SIGNIFICANT CHANGE UP (ref 22–31)
CREAT SERPL-MCNC: 0.56 MG/DL — SIGNIFICANT CHANGE UP (ref 0.5–1.3)
EGFR: 125 ML/MIN/1.73M2 — SIGNIFICANT CHANGE UP
EGFR: 125 ML/MIN/1.73M2 — SIGNIFICANT CHANGE UP
EOSINOPHIL # BLD AUTO: 0.1 K/UL — SIGNIFICANT CHANGE UP (ref 0–0.5)
EOSINOPHIL NFR BLD AUTO: 1 % — SIGNIFICANT CHANGE UP (ref 0–6)
GLUCOSE SERPL-MCNC: 98 MG/DL — SIGNIFICANT CHANGE UP (ref 70–99)
HCT VFR BLD CALC: 32 % — LOW (ref 34.5–45)
HGB BLD-MCNC: 9.9 G/DL — LOW (ref 11.5–15.5)
IMM GRANULOCYTES NFR BLD AUTO: 0.4 % — SIGNIFICANT CHANGE UP (ref 0–0.9)
LYMPHOCYTES # BLD AUTO: 2.51 K/UL — SIGNIFICANT CHANGE UP (ref 1–3.3)
LYMPHOCYTES # BLD AUTO: 26.2 % — SIGNIFICANT CHANGE UP (ref 13–44)
MCHC RBC-ENTMCNC: 25.7 PG — LOW (ref 27–34)
MCHC RBC-ENTMCNC: 30.9 G/DL — LOW (ref 32–36)
MCV RBC AUTO: 83.1 FL — SIGNIFICANT CHANGE UP (ref 80–100)
MONOCYTES # BLD AUTO: 0.43 K/UL — SIGNIFICANT CHANGE UP (ref 0–0.9)
MONOCYTES NFR BLD AUTO: 4.5 % — SIGNIFICANT CHANGE UP (ref 2–14)
NEUTROPHILS # BLD AUTO: 6.47 K/UL — SIGNIFICANT CHANGE UP (ref 1.8–7.4)
NEUTROPHILS NFR BLD AUTO: 67.6 % — SIGNIFICANT CHANGE UP (ref 43–77)
NRBC BLD AUTO-RTO: 0 /100 WBCS — SIGNIFICANT CHANGE UP (ref 0–0)
PLATELET # BLD AUTO: 205 K/UL — SIGNIFICANT CHANGE UP (ref 150–400)
POTASSIUM SERPL-MCNC: 4.8 MMOL/L — SIGNIFICANT CHANGE UP (ref 3.5–5.3)
POTASSIUM SERPL-SCNC: 4.8 MMOL/L — SIGNIFICANT CHANGE UP (ref 3.5–5.3)
PROT SERPL-MCNC: 6.2 G/DL — SIGNIFICANT CHANGE UP (ref 6–8.3)
RBC # BLD: 3.85 M/UL — SIGNIFICANT CHANGE UP (ref 3.8–5.2)
RBC # FLD: 14.6 % — HIGH (ref 10.3–14.5)
SODIUM SERPL-SCNC: 138 MMOL/L — SIGNIFICANT CHANGE UP (ref 135–145)
WBC # BLD: 9.58 K/UL — SIGNIFICANT CHANGE UP (ref 3.8–10.5)
WBC # FLD AUTO: 9.58 K/UL — SIGNIFICANT CHANGE UP (ref 3.8–10.5)

## 2025-03-18 PROCEDURE — 86850 RBC ANTIBODY SCREEN: CPT

## 2025-03-18 PROCEDURE — 86880 COOMBS TEST DIRECT: CPT

## 2025-03-18 PROCEDURE — 36415 COLL VENOUS BLD VENIPUNCTURE: CPT

## 2025-03-18 PROCEDURE — 84156 ASSAY OF PROTEIN URINE: CPT

## 2025-03-18 PROCEDURE — 84550 ASSAY OF BLOOD/URIC ACID: CPT

## 2025-03-18 PROCEDURE — 86901 BLOOD TYPING SEROLOGIC RH(D): CPT

## 2025-03-18 PROCEDURE — 86900 BLOOD TYPING SEROLOGIC ABO: CPT

## 2025-03-18 PROCEDURE — 59050 FETAL MONITOR W/REPORT: CPT

## 2025-03-18 PROCEDURE — 85025 COMPLETE CBC W/AUTO DIFF WBC: CPT

## 2025-03-18 PROCEDURE — 83735 ASSAY OF MAGNESIUM: CPT

## 2025-03-18 PROCEDURE — 83615 LACTATE (LD) (LDH) ENZYME: CPT

## 2025-03-18 PROCEDURE — 82962 GLUCOSE BLOOD TEST: CPT

## 2025-03-18 PROCEDURE — 86780 TREPONEMA PALLIDUM: CPT

## 2025-03-18 PROCEDURE — 82570 ASSAY OF URINE CREATININE: CPT

## 2025-03-18 PROCEDURE — 86870 RBC ANTIBODY IDENTIFICATION: CPT

## 2025-03-18 PROCEDURE — 87340 HEPATITIS B SURFACE AG IA: CPT

## 2025-03-18 PROCEDURE — 86762 RUBELLA ANTIBODY: CPT

## 2025-03-18 PROCEDURE — 80053 COMPREHEN METABOLIC PANEL: CPT

## 2025-03-18 PROCEDURE — 85384 FIBRINOGEN ACTIVITY: CPT

## 2025-03-18 PROCEDURE — 86905 BLOOD TYPING RBC ANTIGENS: CPT

## 2025-03-18 RX ORDER — IBUPROFEN 200 MG
1 TABLET ORAL
Qty: 0 | Refills: 0 | DISCHARGE
Start: 2025-03-18

## 2025-03-18 RX ORDER — ACETAMINOPHEN 500 MG/5ML
3 LIQUID (ML) ORAL
Qty: 0 | Refills: 0 | DISCHARGE
Start: 2025-03-18

## 2025-03-18 RX ORDER — PRENATAL 136/IRON/FOLIC ACID 27 MG-1 MG
1 TABLET ORAL
Qty: 0 | Refills: 0 | DISCHARGE
Start: 2025-03-18

## 2025-03-18 RX ORDER — PRENATAL 136/IRON/FOLIC ACID 27 MG-1 MG
0 TABLET ORAL
Refills: 0 | DISCHARGE

## 2025-03-18 RX ADMIN — Medication 975 MILLIGRAM(S): at 19:01

## 2025-03-18 RX ADMIN — Medication 1 TABLET(S): at 11:29

## 2025-03-18 RX ADMIN — Medication 975 MILLIGRAM(S): at 11:28

## 2025-03-18 RX ADMIN — Medication 600 MILLIGRAM(S): at 08:21

## 2025-03-18 RX ADMIN — Medication 3 MILLILITER(S): at 06:17

## 2025-03-18 RX ADMIN — Medication 975 MILLIGRAM(S): at 06:08

## 2025-03-18 RX ADMIN — Medication 600 MILLIGRAM(S): at 15:01

## 2025-03-18 RX ADMIN — Medication 975 MILLIGRAM(S): at 17:33

## 2025-03-18 RX ADMIN — Medication 600 MILLIGRAM(S): at 02:01

## 2025-03-18 NOTE — PROGRESS NOTE ADULT - ASSESSMENT
A/P 29y s/p , PPD#2, stable, meeting postpartum milestones   #PEC w/ SF  -s/p IV Mag  -Denies toxic complaints  -Normotensive overnight  -Continue to monitor BPs q4h    #Postpartum care  - Pain: well controlled on tylenol/motrin  - GI: Tolerating regular diet  - : urinating without difficulty/pain  - DVT prophylaxis: ambulating frequently  - Dispo: PPD 2, unless otherwise specified

## 2025-03-18 NOTE — DISCHARGE NOTE OB - CARE PLAN
Principal Discharge DX:	Severe preeclampsia, third trimester  Assessment and plan of treatment:	Follow up with your OB in one week for a blood pressure check.  Purchase electronic blood pressure cuff at your local pharmacy. Check blood pressure 3x a day and record pressures in a log. If bp >160/110, you develop a headache not relieved by tylenol, visual disturbances, chest pain, difficulty breathing, or right upper abdominal pain, call your doctor or the hospital, or go to your nearest emergency room.  Secondary Diagnosis:	Postpartum state  Assessment and plan of treatment:	Take Motrin 600mg every 6 hours and/or tylenol 650mg every 6 hours as needed for pain. Call your OB to schedule a follow up appointment in 6 weeks. Nothing per vagina until cleared by your OB - no intercourse, douching, tampons, etc.  Call your OB if you experience severe abdominal pain not improved by oral pain medications, heavy bright red vaginal bleeding saturating more than 1 pad per hour, or fever greater than 100.4F. Consider contraception options to be discussed with your OB.   1

## 2025-03-18 NOTE — DISCHARGE NOTE OB - REASON FOR REFUSAL (REFER PATIENT TO HEALTHCARE PROVIDER FOR FOLLOW-UP):
Anesthesia Evaluation     Patient summary reviewed and Nursing notes reviewed                Airway   Mallampati: II  TM distance: >3 FB  Neck ROM: full  No difficulty expected  Dental      Pulmonary - negative pulmonary ROS   Cardiovascular     (+) valvular problems/murmurs MR    ROS comment: H/O postpartum cardiomyopathy    Neuro/Psych  (+) psychiatric history Anxiety  GI/Hepatic/Renal/Endo - negative ROS     Musculoskeletal (-) negative ROS    Abdominal    Substance History - negative use     OB/GYN    (+) Pregnant, pregnancy induced hypertension        Other                    Anesthesia Plan    ASA 2     epidural       Anesthetic plan, risks, benefits, and alternatives have been provided, discussed and informed consent has been obtained with: patient.    Use of blood products discussed with patient .      CODE STATUS:    Level Of Support Discussed With: Patient  Code Status (Patient has no pulse and is not breathing): CPR (Attempt to Resuscitate)  Medical Interventions (Patient has pulse or is breathing): Full Support       Patient refused

## 2025-03-18 NOTE — DISCHARGE NOTE OB - PLAN OF CARE
Take Motrin 600mg every 6 hours and/or tylenol 650mg every 6 hours as needed for pain. Call your OB to schedule a follow up appointment in 6 weeks. Nothing per vagina until cleared by your OB - no intercourse, douching, tampons, etc.  Call your OB if you experience severe abdominal pain not improved by oral pain medications, heavy bright red vaginal bleeding saturating more than 1 pad per hour, or fever greater than 100.4F. Consider contraception options to be discussed with your OB. Follow up with your OB in one week for a blood pressure check.  Purchase electronic blood pressure cuff at your local pharmacy. Check blood pressure 3x a day and record pressures in a log. If bp >160/110, you develop a headache not relieved by tylenol, visual disturbances, chest pain, difficulty breathing, or right upper abdominal pain, call your doctor or the hospital, or go to your nearest emergency room.

## 2025-03-18 NOTE — DISCHARGE NOTE OB - NS MD DC FALL RISK RISK
For information on Fall & Injury Prevention, visit: [email or web address] OR  [email or web address] OR  https://www.cdc.gov/steadi/patient.html

## 2025-03-18 NOTE — DISCHARGE NOTE OB - HOSPITAL COURSE
Patient is status post a vaginal delivery where she ruled in for preeclampsia with severe features due to a prior diagnosis of gestational hypertension and new scotoma. She was given IV magnesium until 24 hours postpartum. She is now normotensive and denying toxic symptoms. She had an uncomplicated postpartum course and has met her postpartum milestones appropriately.  Vitals are stable for discharge.

## 2025-03-18 NOTE — PROGRESS NOTE ADULT - SUBJECTIVE AND OBJECTIVE BOX
Patient evaluated at bedside this morning, resting comfortable in bed, no acute events overnight.  She reports pain is well controlled with tylenol and motrin.  She denies headache, dizziness, chest pain, palpitations, shortness of breath, nausea, vomiting, fever, chills, heavy vaginal bleeding. She has been ambulating without assistance, voiding spontaneously.  Tolerating food well, without nausea/vomit.      Physical Exam:  T(C): 36.4 (03-18-25 @ 01:58), Max: 36.7 (03-17-25 @ 18:02)  HR: 85 (03-18-25 @ 01:58) (80 - 91)  BP: 114/76 (03-18-25 @ 01:58) (114/76 - 135/90)  RR: 19 (03-18-25 @ 01:58) (18 - 19)  SpO2: 98% (03-18-25 @ 01:58) (98% - 99%)    GA: NAD, A&O x 3  Pulm: no increased work of breathing  Abd: soft, nontender, nondistended, no rebound or guarding, uterus firm.  Extremities: no swelling or calf tenderness                          10.1   16.40 )-----------( 177      ( 17 Mar 2025 06:02 )             32.0     03-17    132[L]  |  101  |  7   ----------------------------<  143[H]  4.4   |  20[L]  |  0.57    Ca    7.0[L]      17 Mar 2025 04:56  Mg     4.8     03-17    TPro  5.8[L]  /  Alb  3.0[L]  /  TBili  0.5  /  DBili  x   /  AST  17  /  ALT  9[L]  /  AlkPhos  122[H]  03-17    acetaminophen     Tablet .. 975 milliGRAM(s) Oral <User Schedule>  benzocaine 20%/menthol 0.5% Spray 1 Spray(s) Topical every 6 hours PRN  dibucaine 1% Ointment 1 Application(s) Topical every 6 hours PRN  diphenhydrAMINE 25 milliGRAM(s) Oral every 6 hours PRN  diphtheria/tetanus/pertussis (acellular) Vaccine (Adacel) 0.5 milliLiter(s) IntraMuscular once  fentanyl (2 MICROgram(s)/mL) + bupivacaine 0.0625%  in 0.9% Sodium Chloride PCEA 250 milliLiter(s) Epidural PCA Continuous  hydrocortisone 1% Cream 1 Application(s) Topical every 6 hours PRN  ibuprofen  Tablet. 600 milliGRAM(s) Oral every 6 hours  influenza   Vaccine 0.5 milliLiter(s) IntraMuscular once  lanolin Ointment 1 Application(s) Topical every 6 hours PRN  magnesium hydroxide Suspension 30 milliLiter(s) Oral two times a day PRN  oxyCODONE    IR 5 milliGRAM(s) Oral every 3 hours PRN  oxyCODONE    IR 5 milliGRAM(s) Oral once PRN  oxytocin Infusion 167 milliUNIT(s)/Min IV Continuous <Continuous>  pramoxine 1%/zinc 5% Cream 1 Application(s) Topical every 4 hours PRN  prenatal multivitamin 1 Tablet(s) Oral daily  simethicone 80 milliGRAM(s) Chew every 4 hours PRN  sodium chloride 0.9% lock flush 3 milliLiter(s) IV Push every 8 hours  witch hazel Pads 1 Application(s) Topical every 4 hours PRN

## 2025-03-18 NOTE — DISCHARGE NOTE OB - FINANCIAL ASSISTANCE
[facility] provides services at a reduced cost to those who are determined to be eligible through [facility]’s financial assistance program. Information regarding [facility]’s financial assistance program can be found by going to [email or web address] or by calling [phone].

## 2025-03-18 NOTE — DISCHARGE NOTE OB - CARE PROVIDER_API CALL
Jami Day  Obstetrics and Gynecology  [address] [place] [id number]  Phone: [phone]  Fax: [phone]  Follow Up Time:

## 2025-03-18 NOTE — DISCHARGE NOTE OB - PHYSICIAN SECTION COMPLETE
12/17/24 1446   Discharge Planning   Expected Discharge Disposition Home H   Does the patient need discharge transport arranged? No     Patient is medically cleared for discharge today.     Updates sent to [facility] with updated discharge information.    DC plan secure   Yes

## 2025-03-20 ENCOUNTER — APPOINTMENT (OUTPATIENT)
Dept: ANTEPARTUM | Facility: CLINIC | Age: 30
End: 2025-03-20

## 2025-03-24 DIAGNOSIS — Z28.09 IMMUNIZATION NOT CARRIED OUT BECAUSE OF OTHER CONTRAINDICATION: ICD-10-CM

## 2025-03-24 DIAGNOSIS — Z28.21 IMMUNIZATION NOT CARRIED OUT BECAUSE OF PATIENT REFUSAL: ICD-10-CM

## 2025-03-24 DIAGNOSIS — Z3A.37 37 WEEKS GESTATION OF PREGNANCY: ICD-10-CM

## 2025-03-25 ENCOUNTER — APPOINTMENT (OUTPATIENT)
Dept: ANTEPARTUM | Facility: CLINIC | Age: 30
End: 2025-03-25

## 2025-03-27 ENCOUNTER — APPOINTMENT (OUTPATIENT)
Dept: ANTEPARTUM | Facility: CLINIC | Age: 30
End: 2025-03-27

## 2025-09-16 ENCOUNTER — TRANSCRIPTION ENCOUNTER (OUTPATIENT)
Age: 30
End: 2025-09-16